# Patient Record
Sex: MALE | Race: WHITE | NOT HISPANIC OR LATINO | Employment: FULL TIME | URBAN - METROPOLITAN AREA
[De-identification: names, ages, dates, MRNs, and addresses within clinical notes are randomized per-mention and may not be internally consistent; named-entity substitution may affect disease eponyms.]

---

## 2021-02-22 ENCOUNTER — APPOINTMENT (OUTPATIENT)
Dept: GENERAL RADIOLOGY | Facility: CLINIC | Age: 26
End: 2021-02-22
Attending: NURSE PRACTITIONER

## 2021-02-22 ENCOUNTER — HOSPITAL ENCOUNTER (EMERGENCY)
Facility: CLINIC | Age: 26
Discharge: HOME | End: 2021-02-22
Attending: NURSE PRACTITIONER | Admitting: NURSE PRACTITIONER

## 2021-02-22 VITALS
SYSTOLIC BLOOD PRESSURE: 124 MMHG | HEART RATE: 79 BPM | DIASTOLIC BLOOD PRESSURE: 77 MMHG | WEIGHT: 145 LBS | RESPIRATION RATE: 16 BRPM | BODY MASS INDEX: 24.5 KG/M2 | OXYGEN SATURATION: 98 % | TEMPERATURE: 98.5 F

## 2021-02-22 DIAGNOSIS — S83.91XA SPRAIN OF RIGHT KNEE, UNSPECIFIED LIGAMENT, INITIAL ENCOUNTER: ICD-10-CM

## 2021-02-22 PROCEDURE — 73552 X-RAY EXAM OF FEMUR 2/>: CPT | Mod: RT

## 2021-02-22 PROCEDURE — 99285 EMERGENCY DEPT VISIT HI MDM: CPT | Performed by: NURSE PRACTITIONER

## 2021-02-22 PROCEDURE — 250N000011 HC RX IP 250 OP 636: Performed by: NURSE PRACTITIONER

## 2021-02-22 PROCEDURE — 73562 X-RAY EXAM OF KNEE 3: CPT | Mod: RT

## 2021-02-22 PROCEDURE — 96372 THER/PROPH/DIAG INJ SC/IM: CPT | Performed by: NURSE PRACTITIONER

## 2021-02-22 PROCEDURE — 250N000013 HC RX MED GY IP 250 OP 250 PS 637: Performed by: NURSE PRACTITIONER

## 2021-02-22 RX ORDER — IBUPROFEN 100 MG/5ML
600 SUSPENSION, ORAL (FINAL DOSE FORM) ORAL ONCE
Status: COMPLETED | OUTPATIENT
Start: 2021-02-22 | End: 2021-02-22

## 2021-02-22 RX ADMIN — IBUPROFEN 600 MG: 100 SUSPENSION ORAL at 18:47

## 2021-02-22 RX ADMIN — HYDROMORPHONE HYDROCHLORIDE 1 MG: 1 INJECTION, SOLUTION INTRAMUSCULAR; INTRAVENOUS; SUBCUTANEOUS at 18:39
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
## 2021-02-23 NOTE — ED PROVIDER NOTES
History     Chief Complaint   Patient presents with     Leg Pain     HPI  Charbel Heard is a 25 year old male who presents to the emergency department for evaluation of right knee and thigh pain after falling on the ice approximately 1 hour prior to arrival.  Patient reports feeling his knee pop out of joint and go back and.  Patient states he has been unable to bear weight on his right leg since this occurred.  He did not hit his head.  Allergies:  Allergies   Allergen Reactions     Augmentin Anaphylaxis     Suprax [Cefixime] Anaphylaxis       Problem List:    Patient Active Problem List    Diagnosis Date Noted     Tobacco use disorder 12/23/2015     Priority: Medium        Past Medical History:    History reviewed. No pertinent past medical history.    Past Surgical History:    Past Surgical History:   Procedure Laterality Date     APPENDECTOMY       HERNIA REPAIR         Family History:    No family history on file.    Social History:  Marital Status:  Single [1]  Social History     Tobacco Use     Smoking status: Current Every Day Smoker     Packs/day: 1.00     Smokeless tobacco: Never Used   Substance Use Topics     Alcohol use: Yes     Comment: Once/year     Drug use: No        Medications:    No current outpatient medications on file.        Review of Systems  As mentioned above in the history present illness. All other systems were reviewed and are negative.    Physical Exam   BP: 124/77  Pulse: 79  Temp: 98.5  F (36.9  C)  Resp: 16  Weight: 65.8 kg (145 lb)  SpO2: 98 %      Physical Exam  Constitutional:       General: He is in acute distress.      Appearance: Normal appearance. He is not ill-appearing.   Cardiovascular:      Rate and Rhythm: Normal rate.   Pulmonary:      Effort: Pulmonary effort is normal.   Musculoskeletal:      Right knee: He exhibits decreased range of motion. He exhibits no swelling and no deformity. Tenderness found. MCL, LCL and patellar tendon tenderness noted.      Right  upper leg: He exhibits tenderness. He exhibits no swelling and no deformity.   Skin:     General: Skin is warm and dry.   Neurological:      General: No focal deficit present.      Mental Status: He is alert and oriented to person, place, and time.      Comments: Right foot pedal pulse is normal. Right foot/leg warm and pink.         ED Course        Procedures         Results for orders placed or performed during the hospital encounter of 02/22/21 (from the past 24 hour(s))   XR Knee Right 3 Views    Narrative    EXAM: XR FEMUR RT 2 VW, XR KNEE RT 3 VW  LOCATION: [facility]  DATE/TIME: 2/22/2021 7:15 PM    INDICATION: Right leg pain.  COMPARISON: None.      Impression    IMPRESSION: Negative right femur. No fracture. No degenerative changes. Negative right knee. No fracture or joint effusion. No degenerative changes.   XR Femur Right 2 Views    Narrative    EXAM: XR FEMUR RT 2 VW, XR KNEE RT 3 VW  LOCATION: [facility]  DATE/TIME: 2/22/2021 7:15 PM    INDICATION: Right leg pain.  COMPARISON: None.      Impression    IMPRESSION: Negative right femur. No fracture. No degenerative changes. Negative right knee. No fracture or joint effusion. No degenerative changes.       Medications   ibuprofen (ADVIL/MOTRIN) suspension 600 mg (600 mg Oral Given 2/22/21 1847)   HYDROmorphone (DILAUDID) injection 1 mg (1 mg Intramuscular Given 2/22/21 1839)       Assessments & Plan (with Medical Decision Making)    25 year old male who presents to the emergency department for evaluation of right knee and thigh pain after falling on the ice approximately 1 hour prior to arrival.  Patient reports feeling his knee pop out of joint and go back and.  Patient states he has been unable to bear weight on his right leg since this occurred.  He did not hit his head.   on exam patient is alert and oriented. Appears distressed and in pain. Right lateral thigh tenderness. Right knee tenderness both medially and laterally.   Tenderness over the patellar tendon.  Right leg is neurovascularly intact.  No swelling or deformity noted.  X-ray of the right femur and right knee is negative.  Patient was given IM Dilaudid and p.o. ibuprofen with some improvement in his pain.  I discussed the imaging findings with patient and his mother.  I recommend close follow-up with orthopedics for likely right knee sprain.  Unfortunately, patient has no insurance and is unclear if he will be able to see orthopedics due to lack of financial resources.  I did explain that he does not need follow-up d/t concern that he is not weight bearing at this time due to his acuity of pain. He could see his primary care provider in the next week to ensure he is improving.  Patient was fitted with an Ace wrap and crutches here today.      Instructed as follows:  Non-weight bearing with crutches, you may begin weight bearing in 2-3 days as tolerated.  Wear ace wrap during the day.  Elevate and rest your knee as much as possible for the next 2 days.    Ice 10-15 minutes at a time 3-4 times a day. (minimum of 60 minutes off).  Tylenol 650 mg every 4-6 hours as needed for pain. or  Ibuprofen 400-600 mg every 6-8 hours as needed for pain  (take with food, stop if causing stomach pains.)  Follow-up with orthopedics. Referral has been sent. They should call you or you can contact them to set-up appointment [phone].  As discussed, you can also recheck in clinic with your regular provider if not able to see orthopedics and improving.  I have reviewed the nursing notes.    I have reviewed the findings, diagnosis, plan and need for follow up with the patient.    New Prescriptions    No medications on file       Final diagnoses:   Sprain of right knee, unspecified ligament, initial encounter       2/22/2021   [facility] EMERGENCY DEPT     Penelope, ISAURA Valverde CNP  02/22/21 1958

## 2021-02-23 NOTE — DISCHARGE INSTRUCTIONS
Non-weight bearing with crutches, you may begin weight bearing in 2-3 days as tolerated.  Wear ace wrap during the day.  Elevate and rest your knee as much as possible for the next 2 days.    Ice 10-15 minutes at a time 3-4 times a day. (minimum of 60 minutes off).  Tylenol 650 mg every 4-6 hours as needed for pain. or  Ibuprofen 400-600 mg every 6-8 hours as needed for pain  (take with food, stop if causing stomach pains.)  Follow-up with orthopedics. Referral has been sent. They should call you or you can contact them to set-up appointment [phone].  As discussed, you can also recheck in clinic with your regular provider if not able to see orthopedics and improving.

## 2021-12-21 ENCOUNTER — TELEPHONE (OUTPATIENT)
Dept: ORTHOPEDICS | Facility: CLINIC | Age: 26
End: 2021-12-21
Payer: COMMERCIAL
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
## 2021-12-22 ENCOUNTER — OFFICE VISIT (OUTPATIENT)
Dept: ORTHOPEDICS | Facility: CLINIC | Age: 26
End: 2021-12-22
Payer: COMMERCIAL

## 2021-12-22 ENCOUNTER — ANCILLARY PROCEDURE (OUTPATIENT)
Dept: GENERAL RADIOLOGY | Facility: CLINIC | Age: 26
End: 2021-12-22
Attending: PREVENTIVE MEDICINE
Payer: COMMERCIAL

## 2021-12-22 DIAGNOSIS — M54.16 LUMBAR RADICULOPATHY: ICD-10-CM
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
## 2021-12-22 DIAGNOSIS — M51.369 LUMBAR DEGENERATIVE DISC DISEASE: ICD-10-CM

## 2021-12-22 DIAGNOSIS — M54.16 LUMBAR RADICULOPATHY: Primary | ICD-10-CM

## 2021-12-22 DIAGNOSIS — M62.830 LUMBAR PARASPINAL MUSCLE SPASM: ICD-10-CM

## 2021-12-22 DIAGNOSIS — S32.020A COMPRESSION FRACTURE OF L2 VERTEBRA, INITIAL ENCOUNTER (H): ICD-10-CM

## 2021-12-22 LAB — RADIOLOGIST FLAGS: ABNORMAL

## 2021-12-22 PROCEDURE — 72100 X-RAY EXAM L-S SPINE 2/3 VWS: CPT | Performed by: RADIOLOGY

## 2021-12-22 PROCEDURE — 99204 OFFICE O/P NEW MOD 45 MIN: CPT | Performed by: PREVENTIVE MEDICINE

## 2021-12-22 RX ORDER — HYDROCODONE BITARTRATE AND ACETAMINOPHEN 5; 325 MG/1; MG/1
1 TABLET ORAL EVERY 6 HOURS PRN
Qty: 28 TABLET | Refills: 0 | Status: SHIPPED | OUTPATIENT
Start: 2021-12-22 | End: 2022-02-07

## 2021-12-22 RX ORDER — GABAPENTIN 100 MG/1
200 CAPSULE ORAL 3 TIMES DAILY
Qty: 180 CAPSULE | Refills: 1 | Status: SHIPPED | OUTPATIENT
Start: 2021-12-22 | End: 2022-02-07
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
## 2021-12-22 NOTE — PATIENT INSTRUCTIONS
Thanks for coming today.  [facility] Clinic  [address] [place] [address]    To schedule future appointments in Ortho Clinic, you may call [phone].    To schedule ordered imaging by your provider:   Call [facility] [phone]    To schedule an injection ordered by your provider:  Call [facility] Injection scheduling line: [phone]  [facility] available online at:  [email or web address]    Please call if any further questions or concerns ([phone]).  Clinic hours 8 am to 5 pm.    Return to clinic (call) if symptoms worsen or fail to improve.

## 2021-12-22 NOTE — LETTER
December 22, 2021      Charbel Heard  [address] [address] [place] [id number]              To whom it may concern:  Charbel is under my care for a medical problem. He will be unable to work and fulfill his job duties until at least 1/9/21. I will followup with him on 1/5/21 and determine further work restrictions.  Please contact me with any questions or concerns.          Sincerely,      Rebel Meredith MD
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
## 2021-12-23 ENCOUNTER — TELEPHONE (OUTPATIENT)
Dept: ORTHOPEDICS | Facility: CLINIC | Age: 26
End: 2021-12-23
Payer: COMMERCIAL

## 2021-12-23 NOTE — TELEPHONE ENCOUNTER
12/23 Provided phone number [phone] to schedule mri.     [facility]   Orthopedics, Podiatry, Sports Medicine, ENT/Eye Specialties  [facility]   [phone]

## 2021-12-27 ENCOUNTER — OFFICE VISIT (OUTPATIENT)
Dept: FAMILY MEDICINE | Facility: OTHER | Age: 26
End: 2021-12-27
Payer: COMMERCIAL

## 2021-12-27 VITALS
OXYGEN SATURATION: 98 % | WEIGHT: 162 LBS | TEMPERATURE: 97.9 F | DIASTOLIC BLOOD PRESSURE: 70 MMHG | SYSTOLIC BLOOD PRESSURE: 110 MMHG | BODY MASS INDEX: 27.38 KG/M2 | RESPIRATION RATE: 18 BRPM | HEART RATE: 71 BPM

## 2021-12-27 DIAGNOSIS — D3A.00 CARCINOID TUMOR, UNSPECIFIED SITE, UNSPECIFIED WHETHER MALIGNANT (H): Primary | ICD-10-CM

## 2021-12-27 PROCEDURE — 99203 OFFICE O/P NEW LOW 30 MIN: CPT | Performed by: STUDENT IN AN ORGANIZED HEALTH CARE EDUCATION/TRAINING PROGRAM
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
## 2021-12-28 ENCOUNTER — PRE VISIT (OUTPATIENT)
Dept: OTHER | Age: 26
End: 2021-12-28
Payer: COMMERCIAL

## 2021-12-28 ENCOUNTER — TELEPHONE (OUTPATIENT)
Dept: GASTROENTEROLOGY | Facility: CLINIC | Age: 26
End: 2021-12-28
Payer: COMMERCIAL

## 2021-12-28 NOTE — TELEPHONE ENCOUNTER
Action 12.28.21 12:46 PM ZOE   Action Taken CT spine/lumbar on 12/19     Called [facility]  Just in case but they said they could not push the image. Faxed request for imaging disc [phone]      Please let Soco Belcher, RN know when disc is received.

## 2021-12-28 NOTE — CONFIDENTIAL NOTE
[facility]     Referring provider: Marbin Larsen MD    Referred to: [facility]     Provider Requested: none specified     Referral Received: 12/28/21     Records received: [facility]  CT 12/19/21  IMPRESSIONS:   1. A moderate acute compression fracture the superior endplate of L2 is present with loss of 50 percent of the vertebral body height. No significant retropulsion or osseous central canal stenosis is seen.   2. There is an incidental soft tissue nodule within the midline pelvis on image 253, series 6 and measures 1 x 0.7 cm. The differential diagnosis includes mesenteric adenopathy or carcinoid tumor.        Images received: PACs    Evaluation for: Carcinoid tumor, unspecified site, unspecified whether malignant     Clinical History (per RN review):   Office visit 12/27  Incidental finding carcinoid tumor versus mesenteric adenopathy  Carcinoid tumor, unspecified site, unspecified whether malignant, possible  Incidental finding on CT imaging of mesenteric adenopathy versus carcinoid.  Patient has no symptoms suggesting carcinoid at this time.  He also has no signs or symptoms of any infectious etiology as well as any bowel concerns or bowel abnormalities, no history of STDs or current symptoms.  Did describe to the patient about the realm of possibilities for work-up for carcinoid and he feels most comfortable starting with urine metanephrines at this time.  Did discuss with him about EGD/colonoscopy and he wishes to hold off until discussing with GI specialist which I think is fair.  He is also dealing with a compression fracture of his low back and would prefer to have this heal a bit more before he considers further intervention.  We will have him follow-up with GI for next best steps for this incidental findings.  - 5 HIAA Quantitative Urine  - Adult Gastro Ref - Consult Only; Future    MD review date:   MD Decision for clinic consultation/Orders:             Referral updates/Patient contacted:

## 2021-12-29 ENCOUNTER — TELEPHONE (OUTPATIENT)
Dept: ORTHOPEDICS | Facility: CLINIC | Age: 26
End: 2021-12-29
Payer: COMMERCIAL

## 2021-12-29 NOTE — TELEPHONE ENCOUNTER
[facility] Call Center    Phone Message    May a detailed message be left on voicemail: yes     Reason for Call: Pt is requesting a call back to let him know when his disability papers are going to be faxed. He thought it would have been done by now.  Thanks.    Action Taken: Message routed to:  Adult Clinics: Sports Medicine p [phone]    Travel Screening: Not Applicable

## 2021-12-30 NOTE — TELEPHONE ENCOUNTER
Dr. Meredith faxed papers yesterday. Patient replied on [employer] asking for the papers to be emailed to him.    Please see [employer] encounter for future communication.      Xiomara Osorio MA, ATC

## 2021-12-31 NOTE — TELEPHONE ENCOUNTER
Action December 31, 2021 11:18 AM ABT   Action Taken Imaging disc from [facility] received and given to Jostin for upload.

## 2022-01-03 ENCOUNTER — PATIENT OUTREACH (OUTPATIENT)
Dept: GASTROENTEROLOGY | Facility: CLINIC | Age: 27
End: 2022-01-03
Payer: COMMERCIAL

## 2022-01-03 DIAGNOSIS — R93.89 ABNORMAL FINDING ON IMAGING: Primary | ICD-10-CM

## 2022-01-03 RX ORDER — HYDROCODONE BITARTRATE AND ACETAMINOPHEN 5; 325 MG/1; MG/1
1 TABLET ORAL 3 TIMES DAILY PRN
Qty: 12 TABLET | Refills: 0 | Status: SHIPPED | OUTPATIENT
Start: 2022-01-03 | End: 2022-01-07

## 2022-01-03 NOTE — PROGRESS NOTES
Called pt to discuss, pt in agreement with recommendations per Dr Ochoa    CT CAP with contrast. Ind - unexplained adenopathy on CT of lumbar spine.   Will need labs  (creatinine) checked prior the CT as has never had, so should get additional labs at that time.   CMP, CBC.   Virtual consult after CT if obtained, read and available for review    Virtual visit arranged for 1/24 at 9:20am. Clinic coordinators message to assist in scheduling labs and CT scan prior to    Soco Belcher RN, BSN,   [facility]  Care coordinator

## 2022-01-04 ENCOUNTER — ANCILLARY PROCEDURE (OUTPATIENT)
Dept: MRI IMAGING | Facility: CLINIC | Age: 27
End: 2022-01-04
Attending: PREVENTIVE MEDICINE
Payer: COMMERCIAL

## 2022-01-04 DIAGNOSIS — S32.020A COMPRESSION FRACTURE OF L2 VERTEBRA, INITIAL ENCOUNTER (H): ICD-10-CM

## 2022-01-04 PROCEDURE — 72148 MRI LUMBAR SPINE W/O DYE: CPT | Performed by: RADIOLOGY

## 2022-01-05 ENCOUNTER — OFFICE VISIT (OUTPATIENT)
Dept: ORTHOPEDICS | Facility: CLINIC | Age: 27
End: 2022-01-05
Payer: COMMERCIAL

## 2022-01-05 DIAGNOSIS — S32.020S CLOSED COMPRESSION FRACTURE OF L2 LUMBAR VERTEBRA, SEQUELA: Primary | ICD-10-CM

## 2022-01-05 PROCEDURE — 99213 OFFICE O/P EST LOW 20 MIN: CPT | Performed by: PREVENTIVE MEDICINE

## 2022-01-05 NOTE — PROGRESS NOTES
[facility] FOLLOW-UP VISIT 1/5/2022    Charbel Heard's chief complaint for this visit includes:  Chief Complaint   Patient presents with     RECHECK     Follow up on MRI     PCP: Vince Wall    Referring Provider:  No referring provider defined for this encounter.    There were no vitals taken for this visit.  Data Unavailable       Interval History:     Follow up reason: Follow up MRI 1/4/2022    Date of injury: 12/19/2021    Date last seen: 12/22/2021    Following Therapeutic Plan: Yes     Pain: Improving    Function: Improving    Interval History: Patient is improving.      Medical History:    Any recent changes to your medical history? No    Any new medication prescribed since last visit? No    Review of Systems:    Do you have fever, chills, weight loss? No    Do you have any vision problems? No    Do you have any chest pain or edema? No    Do you have any shortness of breath or wheezing?  No    Do you have stomach problems? No    Do you have any urinary track issues? No    Do you have any numbness or focal weakness? No    Do you have diabetes? No    Do you have problems with bleeding or clotting? No    Do you have an rashes or other skin lesions? No

## 2022-01-05 NOTE — LETTER
1/5/2022         RE: Charbel Heard  [address] [address] [place] [address]        Dear Colleague,    Thank you for referring your patient, Charbel Heard, to the [facility] [place]. Please see a copy of my visit note below.          [facility] FOLLOW-UP VISIT 1/5/2022    Charbel Heard's chief complaint for this visit includes:  Chief Complaint   Patient presents with     RECHECK     Follow up on MRI     PCP: Vince Wall    Referring Provider:  No referring provider defined for this encounter.    There were no vitals taken for this visit.  Data Unavailable       Interval History:     Follow up reason: Follow up MRI 1/4/2022    Date of injury: 12/19/2021    Date last seen: 12/22/2021    Following Therapeutic Plan: Yes     Pain: Improving    Function: Improving    Interval History: Patient is improving.      Medical History:    Any recent changes to your medical history? No    Any new medication prescribed since last visit? No    Review of Systems:    Do you have fever, chills, weight loss? No    Do you have any vision problems? No    Do you have any chest pain or edema? No    Do you have any shortness of breath or wheezing?  No    Do you have stomach problems? No    Do you have any urinary track issues? No    Do you have any numbness or focal weakness? No    Do you have diabetes? No    Do you have problems with bleeding or clotting? No    Do you have an rashes or other skin lesions? No    HISTORY OF PRESENT ILLNESS  Mr. Heard is a pleasant 26 year old year old male who presents to clinic today following up for Lumbar compression fracture  Pain is improved  Still having some pain with movements, but better controlled with medications and rest  No new symptoms      MEDICAL HISTORY  Patient Active Problem List   Diagnosis     Tobacco use disorder       Current Outpatient Medications   Medication Sig Dispense Refill     gabapentin  (NEURONTIN) 100 MG capsule Take 2 capsules (200 mg) by mouth 3 times daily 180 capsule 1     HYDROcodone-acetaminophen (NORCO) 5-325 MG tablet Take 1 tablet by mouth every 6 hours as needed for severe pain 28 tablet 0     tiZANidine (ZANAFLEX) 4 MG tablet Take 1 tablet (4 mg) by mouth 3 times daily as needed for muscle spasms 60 tablet 1       Allergies   Allergen Reactions     Augmentin Anaphylaxis     Suprax [Cefixime] Anaphylaxis       No family history on file.  Social History     Socioeconomic History     Marital status: Single     Spouse name: Not on file     Number of children: Not on file     Years of education: Not on file     Highest education level: Not on file   Occupational History     Not on file   Tobacco Use     Smoking status: Current Every Day Smoker     Packs/day: 1.00     Smokeless tobacco: Never Used   Vaping Use     Vaping Use: Never used   Substance and Sexual Activity     Alcohol use: Yes     Comment: Once/year     Drug use: No     Sexual activity: Yes     Partners: Female     Birth control/protection: Condom   Other Topics Concern     Not on file   Social History Narrative     Not on file     Social Determinants of Health     Financial Resource Strain: Not on file   Food Insecurity: Not on file   Transportation Needs: Not on file   Physical Activity: Not on file   Stress: Not on file   Social Connections: Not on file   Intimate Partner Violence: Not on file   Housing Stability: Not on file       Additional medical/Social/Surgical histories reviewed in [facility] and updated as appropriate.     REVIEW OF SYSTEMS (1/5/2022)  10 point ROS of systems including Constitutional, Eyes, Respiratory, Cardiovascular, Gastroenterology, Genitourinary, Integumentary, Musculoskeletal, Psychiatric, Allergic/Immunologic were all negative except for pertinent positives noted in my HPI.     PHYSICAL EXAM  VSS  General  - normal appearance, in no obvious distress  HEENT  - conjunctivae not injected, moist mucous  membranes, normocephalic/atraumatic head, ears normal appearance, no lesions, mouth normal appearance, no scars, normal dentition and teeth present  CV  - normal peripheral perfusion  Pulm  - normal respiratory pattern, non-labored  Musculoskeletal - lumbar spine  - stance: normal gait without limp, no obvious leg length discrepancy, normal heel and toe walk  - inspection: normal bone and joint alignment, no obvious scoliosis  - palpation: ttp over L2 vertebrae in area of compression fracture  And along paraspinal muscles in that area  - ROM: flexion exacerbates pain, normal extension, sidebending, rotation  - strength: lower extremities 5/5 in all planes    Neuro  - patellar and Achilles DTRs 2+ bilaterally, NO lower extremity sensory deficit throughout L5 distribution, grossly normal coordination, normal muscle tone  Skin  - no ecchymosis, erythema, warmth, or induration, no obvious rash  Psych  - interactive, appropriate, normal mood and affect  ASSESSMENT & PLAN  27 yo male with L2 compression fracture, improved, not resolved    I independently reviewed the following imaging studies:    Stable appearance of the anterior wedging compression deformity of  L2 with edema along the fracture line and the appearance of a benign  Fracture.    Will plan on followup in 2 - 3weeks and repeat xrays  Cont. Medications  Cont. Light stretches  Given work note and filled out paperwork for work restrictions    Appropriate PPE was utilized for prevention of spread of Covid-19.  Rebel Meredith MD, CAM        Again, thank you for allowing me to participate in the care of your patient.        Sincerely,        Rebel Meredith MD

## 2022-01-05 NOTE — LETTER
January 5, 2022      RE: Charbel Heard  [address] [address] [place] [address]        To whom it may concern:    Charbel Heard is under my professional care for a medical problem. He will be unable to work and fulfill his job duties until at least 1/19/22. I will followup with him on 1/19/22 and determine further work restrictions.  Please contact me with any questions or concerns.      Sincerely,      eRbel Meredith MD

## 2022-01-07 ENCOUNTER — TELEPHONE (OUTPATIENT)
Dept: GASTROENTEROLOGY | Facility: CLINIC | Age: 27
End: 2022-01-07
Payer: COMMERCIAL

## 2022-01-13 ENCOUNTER — APPOINTMENT (OUTPATIENT)
Dept: LAB | Facility: OTHER | Age: 27
End: 2022-01-13
Payer: COMMERCIAL

## 2022-01-13 ENCOUNTER — PATIENT OUTREACH (OUTPATIENT)
Dept: GASTROENTEROLOGY | Facility: CLINIC | Age: 27
End: 2022-01-13

## 2022-01-13 ENCOUNTER — LAB (OUTPATIENT)
Dept: LAB | Facility: CLINIC | Age: 27
End: 2022-01-13
Payer: COMMERCIAL

## 2022-01-13 ENCOUNTER — ANCILLARY PROCEDURE (OUTPATIENT)
Dept: CT IMAGING | Facility: CLINIC | Age: 27
End: 2022-01-13
Attending: INTERNAL MEDICINE
Payer: COMMERCIAL

## 2022-01-13 DIAGNOSIS — R93.89 ABNORMAL FINDING ON IMAGING: ICD-10-CM

## 2022-01-13 LAB
ALBUMIN SERPL-MCNC: 3.9 G/DL (ref 3.4–5)
ALP SERPL-CCNC: 108 U/L (ref 40–150)
ALT SERPL W P-5'-P-CCNC: 62 U/L (ref 0–70)
ANION GAP SERPL CALCULATED.3IONS-SCNC: 4 MMOL/L (ref 3–14)
AST SERPL W P-5'-P-CCNC: 14 U/L (ref 0–45)
BILIRUB SERPL-MCNC: 0.2 MG/DL (ref 0.2–1.3)
BUN SERPL-MCNC: 6 MG/DL (ref 7–30)
CALCIUM SERPL-MCNC: 9 MG/DL (ref 8.5–10.1)
CHLORIDE BLD-SCNC: 106 MMOL/L (ref 94–109)
CO2 SERPL-SCNC: 28 MMOL/L (ref 20–32)
CREAT SERPL-MCNC: 0.75 MG/DL (ref 0.66–1.25)
ERYTHROCYTE [DISTWIDTH] IN BLOOD BY AUTOMATED COUNT: 11.9 % (ref 10–15)
GFR SERPL CREATININE-BSD FRML MDRD: >90 ML/MIN/1.73M2
GLUCOSE BLD-MCNC: 127 MG/DL (ref 70–99)
HCT VFR BLD AUTO: 46.9 % (ref 40–53)
HGB BLD-MCNC: 15.8 G/DL (ref 13.3–17.7)
MCH RBC QN AUTO: 30.8 PG (ref 26.5–33)
MCHC RBC AUTO-ENTMCNC: 33.7 G/DL (ref 31.5–36.5)
MCV RBC AUTO: 91 FL (ref 78–100)
PLATELET # BLD AUTO: 271 10E3/UL (ref 150–450)
POTASSIUM BLD-SCNC: 4 MMOL/L (ref 3.4–5.3)
PROT SERPL-MCNC: 8 G/DL (ref 6.8–8.8)
RADIOLOGIST FLAGS: ABNORMAL
RBC # BLD AUTO: 5.13 10E6/UL (ref 4.4–5.9)
SODIUM SERPL-SCNC: 138 MMOL/L (ref 133–144)
WBC # BLD AUTO: 6.2 10E3/UL (ref 4–11)

## 2022-01-13 PROCEDURE — 85027 COMPLETE CBC AUTOMATED: CPT

## 2022-01-13 PROCEDURE — 71260 CT THORAX DX C+: CPT | Performed by: RADIOLOGY

## 2022-01-13 PROCEDURE — 36415 COLL VENOUS BLD VENIPUNCTURE: CPT

## 2022-01-13 PROCEDURE — 74177 CT ABD & PELVIS W/CONTRAST: CPT | Performed by: RADIOLOGY

## 2022-01-13 PROCEDURE — 80053 COMPREHEN METABOLIC PANEL: CPT

## 2022-01-13 RX ORDER — IOPAMIDOL 755 MG/ML
99 INJECTION, SOLUTION INTRAVASCULAR ONCE
Status: COMPLETED | OUTPATIENT
Start: 2022-01-13 | End: 2022-01-13

## 2022-01-13 RX ADMIN — IOPAMIDOL 99 ML: 755 INJECTION, SOLUTION INTRAVASCULAR at 12:18

## 2022-01-13 NOTE — PROGRESS NOTES
"Called pt to update that findings on CT scan of  \"Peripheral groundglass opacities, most prominent in the posterior  lower lobes. Findings can be seen in viral infection such as COVID 19.\"    Reviewed with Dr Ochoa. Advised pt to seek care if develops respiratory symptoms or distress. Pt verbalized understanding, no symptoms at this time    Soco Belcher, RN, BSN,   [facility]  Care coordinator      "

## 2022-01-17 NOTE — TELEPHONE ENCOUNTER
RECORDS STATUS - ALL OTHER DIAGNOSIS      RECORDS RECEIVED FROM: [facility]   DATE RECEIVED: 22   NOTES STATUS DETAILS   OFFICE NOTE from referring provider EPIC 21: Dr. Marbin Larsen   OFFICE NOTE from medical oncologist     DISCHARGE SUMMARY from hospital     DISCHARGE REPORT from the ER [facility] 21: [facility]   OPERATIVE REPORT     MEDICATION LIST [facility]    CLINICAL TRIAL TREATMENTS TO DATE     LABS     PATHOLOGY REPORTS     ANYTHING RELATED TO DIAGNOSIS Epic Most recent labs 22   GENONOMIC TESTING     TYPE:     IMAGING (NEED IMAGES & REPORT)  [facility] Disc [employer]   CT SCANS PACS 22: CT Chest  21: CT Spine   XRAYS PACS 21: XR Lumbar   MRI PACS 22: MR Lumbar   MAMMO     ULTRASOUND     PET       Action 2022 11:10 AM ABT   Action Taken Imaging disc from [facility] received and given to Jostin for upload

## 2022-01-19 ENCOUNTER — OFFICE VISIT (OUTPATIENT)
Dept: ORTHOPEDICS | Facility: CLINIC | Age: 27
End: 2022-01-19
Payer: COMMERCIAL

## 2022-01-19 ENCOUNTER — APPOINTMENT (OUTPATIENT)
Dept: LAB | Facility: CLINIC | Age: 27
End: 2022-01-19
Payer: COMMERCIAL

## 2022-01-19 ENCOUNTER — ANCILLARY PROCEDURE (OUTPATIENT)
Dept: GENERAL RADIOLOGY | Facility: CLINIC | Age: 27
End: 2022-01-19
Attending: PREVENTIVE MEDICINE
Payer: COMMERCIAL

## 2022-01-19 DIAGNOSIS — S32.020S CLOSED COMPRESSION FRACTURE OF L2 LUMBAR VERTEBRA, SEQUELA: Primary | ICD-10-CM

## 2022-01-19 DIAGNOSIS — M62.830 LUMBAR PARASPINAL MUSCLE SPASM: ICD-10-CM

## 2022-01-19 DIAGNOSIS — S32.020S CLOSED COMPRESSION FRACTURE OF L2 LUMBAR VERTEBRA, SEQUELA: ICD-10-CM

## 2022-01-19 PROCEDURE — 99213 OFFICE O/P EST LOW 20 MIN: CPT | Performed by: PREVENTIVE MEDICINE

## 2022-01-19 PROCEDURE — 72100 X-RAY EXAM L-S SPINE 2/3 VWS: CPT | Performed by: RADIOLOGY

## 2022-01-19 PROCEDURE — 83497 ASSAY OF 5-HIAA: CPT | Mod: 90 | Performed by: STUDENT IN AN ORGANIZED HEALTH CARE EDUCATION/TRAINING PROGRAM

## 2022-01-19 PROCEDURE — 99000 SPECIMEN HANDLING OFFICE-LAB: CPT | Performed by: STUDENT IN AN ORGANIZED HEALTH CARE EDUCATION/TRAINING PROGRAM

## 2022-01-19 NOTE — LETTER
1/19/2022         RE: Charbel Heard  [address] [facility] [address]        Dear Colleague,    Thank you for referring your patient, Charbel Heard, to the [facility] [place]. Please see a copy of my visit note below.    HISTORY OF PRESENT ILLNESS  Mr. Heard is a pleasant 26 year old year old male who presents to clinic today with followup for compression fracture in lumbar spine  Doing better  Wants to review imaging  And discuss options  Has not started PT yet  Only using otc tylenol and ibuprofen  Has not worked yet and wants to discuss    MEDICAL HISTORY  Patient Active Problem List   Diagnosis     Tobacco use disorder       Current Outpatient Medications   Medication Sig Dispense Refill     gabapentin (NEURONTIN) 100 MG capsule Take 2 capsules (200 mg) by mouth 3 times daily 180 capsule 1     HYDROcodone-acetaminophen (NORCO) 5-325 MG tablet Take 1 tablet by mouth every 6 hours as needed for severe pain 28 tablet 0     tiZANidine (ZANAFLEX) 4 MG tablet Take 1 tablet (4 mg) by mouth 3 times daily as needed for muscle spasms 60 tablet 1       Allergies   Allergen Reactions     Augmentin Anaphylaxis     Suprax [Cefixime] Anaphylaxis       No family history on file.  Social History     Socioeconomic History     Marital status: Single     Spouse name: Not on file     Number of children: Not on file     Years of education: Not on file     Highest education level: Not on file   Occupational History     Not on file   Tobacco Use     Smoking status: Current Every Day Smoker     Packs/day: 1.00     Smokeless tobacco: Never Used   Vaping Use     Vaping Use: Never used   Substance and Sexual Activity     Alcohol use: Yes     Comment: Once/year     Drug use: No     Sexual activity: Yes     Partners: Female     Birth control/protection: Condom   Other Topics Concern     Not on file   Social History Narrative     Not on file     Social Determinants of Health      Financial Resource Strain: Not on file   Food Insecurity: Not on file   Transportation Needs: Not on file   Physical Activity: Not on file   Stress: Not on file   Social Connections: Not on file   Intimate Partner Violence: Not on file   Housing Stability: Not on file       Additional medical/Social/Surgical histories reviewed in [facility] and updated as appropriate.     REVIEW OF SYSTEMS (1/19/2022)  10 point ROS of systems including Constitutional, Eyes, Respiratory, Cardiovascular, Gastroenterology, Genitourinary, Integumentary, Musculoskeletal, Psychiatric, Allergic/Immunologic were all negative except for pertinent positives noted in my HPI.     PHYSICAL EXAM  VSS  General  - normal appearance, in no obvious distress  HEENT  - conjunctivae not injected, moist mucous membranes, normocephalic/atraumatic head, ears normal appearance, no lesions, mouth normal appearance, no scars, normal dentition and teeth present  CV  - normal peripheral perfusion  Pulm  - normal respiratory pattern, non-labored  Musculoskeletal - lumbar spine  - stance: normal gait without limp, no obvious leg length discrepancy, normal heel and toe walk  - inspection: normal bone and joint alignment, no obvious scoliosis  - palpation: no paravertebral or bony tenderness  - ROM: flexion exacerbates pain, normal extension, sidebending, rotation  - strength: lower extremities 5/5 in all planes  - special tests:  (-) straight leg raise  (+) slump test  Neuro  - patellar and Achilles DTRs 2+ bilaterally, NO lower extremity sensory deficit throughout L5 distribution, grossly normal coordination, normal muscle tone  Skin  - no ecchymosis, erythema, warmth, or induration, no obvious rash  Psych  - interactive, appropriate, normal mood and affect  ASSESSMENT & PLAN  25 yo male with lumbar compression fracture, stable, slightly improved    I independently reviewed the following imaging studies:  Lumbar xray and MRI: shows stable compression  fracture  Discussed ongoing work restrictions  Will f/u in 2 weeks to discuss  Cont. Medications PRN  Ordered PT    Appropriate PPE was utilized for prevention of spread of Covid-19.  Rebel Meredith MD, CAQSM        Again, thank you for allowing me to participate in the care of your patient.        Sincerely,        Rebel Meredith MD

## 2022-01-19 NOTE — PROGRESS NOTES
HISTORY OF PRESENT ILLNESS  Mr. Heard is a pleasant 26 year old year old male who presents to clinic today with followup for compression fracture in lumbar spine  Doing better  Wants to review imaging  And discuss options  Has not started PT yet  Only using otc tylenol and ibuprofen  Has not worked yet and wants to discuss    MEDICAL HISTORY  Patient Active Problem List   Diagnosis     Tobacco use disorder       Current Outpatient Medications   Medication Sig Dispense Refill     gabapentin (NEURONTIN) 100 MG capsule Take 2 capsules (200 mg) by mouth 3 times daily 180 capsule 1     HYDROcodone-acetaminophen (NORCO) 5-325 MG tablet Take 1 tablet by mouth every 6 hours as needed for severe pain 28 tablet 0     tiZANidine (ZANAFLEX) 4 MG tablet Take 1 tablet (4 mg) by mouth 3 times daily as needed for muscle spasms 60 tablet 1       Allergies   Allergen Reactions     Augmentin Anaphylaxis     Suprax [Cefixime] Anaphylaxis       No family history on file.  Social History     Socioeconomic History     Marital status: Single     Spouse name: Not on file     Number of children: Not on file     Years of education: Not on file     Highest education level: Not on file   Occupational History     Not on file   Tobacco Use     Smoking status: Current Every Day Smoker     Packs/day: 1.00     Smokeless tobacco: Never Used   Vaping Use     Vaping Use: Never used   Substance and Sexual Activity     Alcohol use: Yes     Comment: Once/year     Drug use: No     Sexual activity: Yes     Partners: Female     Birth control/protection: Condom   Other Topics Concern     Not on file   Social History Narrative     Not on file     Social Determinants of Health     Financial Resource Strain: Not on file   Food Insecurity: Not on file   Transportation Needs: Not on file   Physical Activity: Not on file   Stress: Not on file   Social Connections: Not on file   Intimate Partner Violence: Not on file   Housing Stability: Not on file        Additional medical/Social/Surgical histories reviewed in [facility] and updated as appropriate.     REVIEW OF SYSTEMS (1/19/2022)  10 point ROS of systems including Constitutional, Eyes, Respiratory, Cardiovascular, Gastroenterology, Genitourinary, Integumentary, Musculoskeletal, Psychiatric, Allergic/Immunologic were all negative except for pertinent positives noted in my HPI.     PHYSICAL EXAM  VSS  General  - normal appearance, in no obvious distress  HEENT  - conjunctivae not injected, moist mucous membranes, normocephalic/atraumatic head, ears normal appearance, no lesions, mouth normal appearance, no scars, normal dentition and teeth present  CV  - normal peripheral perfusion  Pulm  - normal respiratory pattern, non-labored  Musculoskeletal - lumbar spine  - stance: normal gait without limp, no obvious leg length discrepancy, normal heel and toe walk  - inspection: normal bone and joint alignment, no obvious scoliosis  - palpation: no paravertebral or bony tenderness  - ROM: flexion exacerbates pain, normal extension, sidebending, rotation  - strength: lower extremities 5/5 in all planes  - special tests:  (-) straight leg raise  (+) slump test  Neuro  - patellar and Achilles DTRs 2+ bilaterally, NO lower extremity sensory deficit throughout L5 distribution, grossly normal coordination, normal muscle tone  Skin  - no ecchymosis, erythema, warmth, or induration, no obvious rash  Psych  - interactive, appropriate, normal mood and affect  ASSESSMENT & PLAN  25 yo male with lumbar compression fracture, stable, slightly improved    I independently reviewed the following imaging studies:  Lumbar xray and MRI: shows stable compression fracture  Discussed ongoing work restrictions  Will f/u in 2 weeks to discuss  Cont. Medications PRN  Ordered PT    Appropriate PPE was utilized for prevention of spread of Covid-19.  Reble Meredith MD, CAQSM

## 2022-01-19 NOTE — LETTER
January 19, 2022      RE: Charbel Heard  [address] [facility] [address]        To whom it may concern:    Charbel Heard is under my professional care for a medical problem.  The patient can return to work as of 1/24/22 for up to 6 hours per day until I re-evaluate him on 2/2/22.    When the patient returns to work, the following restrictions apply until 2/2/22.  A) Bend and squat: Occasionally (6 hours), with use of lumbar support brace.    B) Walk/Stand: Occasionally up to 6 hours    E) Lift, carry, push, and pull no more than:  0-10 lbs.Light duty-unable to lift more than 10 pounds       Sincerely,      Rebel Meredith MD
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
## 2022-01-19 NOTE — LETTER
January 19, 2022      Charbel Heard  [address] [address] [place] [id number]              Charbel Heard is under my professional care for a medical problem. He will be unable to work and fulfill his job duties until at least 2/2/22. I will followup with him on 2/2/22 and determine further work restrictions.  Please contact me with any questions or concerns.      Sincerely,      Rebel Meredith MD

## 2022-01-23 ENCOUNTER — HEALTH MAINTENANCE LETTER (OUTPATIENT)
Age: 27
End: 2022-01-23

## 2022-01-24 ENCOUNTER — PRE VISIT (OUTPATIENT)
Dept: GASTROENTEROLOGY | Facility: CLINIC | Age: 27
End: 2022-01-24
Payer: COMMERCIAL

## 2022-01-24 ENCOUNTER — TELEPHONE (OUTPATIENT)
Dept: GASTROENTEROLOGY | Facility: CLINIC | Age: 27
End: 2022-01-24

## 2022-01-24 ENCOUNTER — PATIENT OUTREACH (OUTPATIENT)
Dept: GASTROENTEROLOGY | Facility: CLINIC | Age: 27
End: 2022-01-24

## 2022-01-24 ENCOUNTER — VIRTUAL VISIT (OUTPATIENT)
Dept: GASTROENTEROLOGY | Facility: CLINIC | Age: 27
End: 2022-01-24
Attending: INTERNAL MEDICINE
Payer: COMMERCIAL

## 2022-01-24 DIAGNOSIS — R93.89 ABNORMAL FINDING ON IMAGING: Primary | ICD-10-CM

## 2022-01-24 DIAGNOSIS — D3A.00 CARCINOID TUMOR, UNSPECIFIED SITE, UNSPECIFIED WHETHER MALIGNANT (H): ICD-10-CM

## 2022-01-24 PROCEDURE — 99204 OFFICE O/P NEW MOD 45 MIN: CPT | Mod: 95 | Performed by: INTERNAL MEDICINE

## 2022-01-24 NOTE — TELEPHONE ENCOUNTER
Caller: Writer called Charbel and left message to return call + sent NEWLINE SOFTWAREhart message    Procedure: EUS/COLON    Ordering Provider: Neftali Ochoa MD      Any Staff messages sent regarding case?: Yes    ----- Message from Soco Belcher RN sent at 1/24/2022  1:01 PM CST -----  Regarding: Lower EUS and Colon with Mallery to schedule  Hi    I had sent a message earlier today regarding an order for lower EUS with Don. Please add on a full colonoscopy to that, I have edited the gastro procedure order to reflect this as well.    Thanks  [place]

## 2022-01-24 NOTE — PROGRESS NOTES
Called to discuss with patient.  Lower EUS with MAC with Dr Ochoa     Explained they can expect a call from  for date and time of procedure, will need a , someone to stay with them for 24 hours and should stay in town for 24 hours (within 45 min of Hospital) post procedure    Patient needs to get pre-op physical completed. If outside  health system will need physical faxed to number [phone]   If you do not get a preop physical, your procedure could be cancelled, patient voiced understanding*    Preop Plan: pt understands needs to be within 30 days of procedure    Med Review    Blood thinner -  none  ASA - none  Diabetic - none    COVID test discussed: yes, within 4 days of procedure date    Patient Education r/t procedure: [facility]    A pre-op nurse will call 1-2 days prior to the procedure.     NPO/Prep: miralax bowel prep discussed, education sent via [facility]    Verbalized understanding of all instructions. All questions answered.      Gastro procedure order placed, message routed to endo     Soco Belcher, RN, BSN,   [facility]  Care coordinator
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
## 2022-01-24 NOTE — TELEPHONE ENCOUNTER
[place] -     See clinic note.    Please arrange for lower EUS with MAC with me in [facility].    ARNOL Ochoa MD  Professor of Medicine  Division of Gastroenterology, Hepatology and Nutrition  [facility]

## 2022-01-31 ENCOUNTER — TELEPHONE (OUTPATIENT)
Dept: GASTROENTEROLOGY | Facility: CLINIC | Age: 27
End: 2022-01-31
Payer: COMMERCIAL

## 2022-01-31 NOTE — TELEPHONE ENCOUNTER
FUTURE VISIT INFORMATION      SURGERY INFORMATION:    Date: 22    Location:  gi    Surgeon:  Neftali Ochoa MD    Consult: virtual visit     RECORDS REQUESTED FROM:       Primary Care Provider: Vince Bhatti    Most recent EKG+ Tracin21

## 2022-01-31 NOTE — TELEPHONE ENCOUNTER
Screening Questions  Blue=prep questions Red=location Green=sedation   1. Are you active on mychart? YES    2. What insurance is in the chart? PREFERRED ONE     3.  Ordering/Referring Provider: Neftali Ochoa MD    4. BMI 27.5, If greater than 40 review exclusion criteria also will need EXTENDED PREP    5.  Respiratory Screening (If yes to any of the following HOSPITAL setting only):     Do you use daily home oxygen? N  Do you have mod to severe Obstructive Sleep Apnea? N (can be seen at [facility] or hospital setting)    Do you have Pulmonary Hypertension? N   Do you have UNCONTROLLED asthma? N    6. Have you had a heart or lung transplant? N  (If yes, please review exclusion criteria)    7. Are you currently on dialysis?N  (If yes, schedule in HOSPITAL setting only)(If yes, please send Golytely prep)    8. Do you have chronic kidney disease? N (If yes, please send Golytely prep)    9. Have you had a stroke or Transient ischemic attack (TIA) within 6 months? N (If yes, do not schedule at [facility])    10. In the past 6 months, have you had any heart related issues including cardiomyopathy or heart attack? N (If yes, please review exclusion criteria)           If yes, did it require cardiac stenting or other implantable device?N  (If yes, please review exclusion criteria)      11. Do you have any implantable devices in your body (pacemaker, defib, LVAD)? N (If yes, schedule at [facility])    12. Do you take nitroglycerin? If yes, how often? N (if yes, schedule at HOSPITAL setting)    13. Are you currently taking any blood thinners?N (If yes- inform patient to follow up with PCP or provider for follow up instructions)     14. Are you a diabetic? N (If yes, please send Golytely prep)    15. (Females) Are you currently pregnant? NA  If yes, how many weeks?      16. Are you taking any prescription pain medications on a routine schedule? N If yes, MAC sedation and patient will need EXTENDED PREP.    17. Do you have any chemical  dependencies such as alcohol, street drugs, or methadone? N If yes, MAC sedation     18. Do you have any history of post-traumatic stress syndrome, severe anxiety or history of psychosis? N  If yes, MAC sedation.     19. Do you transfer independently? Y    20.  Do you have any issues with constipation? N   If yes, pt will need EXTENDED PREP     21. Preferred Pharmacy for Pre Prescription [facility] IN [place]    Scheduling Details    Which Colonoscopy Prep was Sent?:   Type of Procedure Scheduled: LOWER EUS  Surgeon: CANDI  Date of Procedure: 2/22/2022  Location: [facility]  Caller (Please ask for phone number if not scheduled by patient): [place]       Sedation Type: MAC  Conscious Sedation- Needs  for 6 hours after the procedure  MAC/General-Needs  for 24 hours after procedure    Pre-op Required at [facility], [place], [place] and OR for MAC sedation: Y  (if yes advise patient they will need a pre-op prior to procedure)      Informed patient they will need an adult  Y  Cannot take any type of public or medical transportation alone    Pre-Procedure Covid test to be completed at [facility] or Externally: Y    Confirmed Nurse will call to complete assessment Y    Additional comments:  ([facility] PATIENTS NEED EXTENDED PREP)

## 2022-02-02 ENCOUNTER — ANCILLARY PROCEDURE (OUTPATIENT)
Dept: GENERAL RADIOLOGY | Facility: CLINIC | Age: 27
End: 2022-02-02
Attending: PREVENTIVE MEDICINE
Payer: COMMERCIAL

## 2022-02-02 ENCOUNTER — OFFICE VISIT (OUTPATIENT)
Dept: ORTHOPEDICS | Facility: CLINIC | Age: 27
End: 2022-02-02
Payer: COMMERCIAL

## 2022-02-02 DIAGNOSIS — S32.020A COMPRESSION FRACTURE OF L2 VERTEBRA, INITIAL ENCOUNTER (H): ICD-10-CM

## 2022-02-02 DIAGNOSIS — M54.16 LUMBAR RADICULOPATHY: Primary | ICD-10-CM

## 2022-02-02 DIAGNOSIS — M54.16 LUMBAR RADICULOPATHY: ICD-10-CM

## 2022-02-02 PROCEDURE — 99213 OFFICE O/P EST LOW 20 MIN: CPT | Performed by: PREVENTIVE MEDICINE

## 2022-02-02 PROCEDURE — 72100 X-RAY EXAM L-S SPINE 2/3 VWS: CPT | Performed by: RADIOLOGY

## 2022-02-02 NOTE — LETTER
February 2, 2022      RE: Charbel Heard  [address] [facility] [address]        To whom it may concern:     Charbel Heard is under my professional care for a medical problem.  The patient can return to work as of 2/2/22 for up to 6 hours per day until I re-evaluate him on 2/25/22     When the patient returns to work, the following restrictions apply until 2/25/22  A) Bend and squat: Occasionally (6 hours), with use of lumbar support brace.     B) Walk/Stand: Occasionally up to 6 hours     E) Lift, carry, push, and pull no more than:  0-10 lbs.Light duty-unable to lift more than 10 pounds         Sincerely,        Rebel Meredith MD

## 2022-02-02 NOTE — LETTER
2/2/2022         RE: Charbel Heard  [address] [facility] [address]        Dear Colleague,    Thank you for referring your patient, Charbel Heard, to the [facility] [place]. Please see a copy of my visit note below.          [facility] FOLLOW-UP VISIT 2/2/2022    Charbel Heard's chief complaint for this visit includes:  Chief Complaint   Patient presents with     RECHECK     follow up low back pain     PCP: Vince Wall    Referring Provider:  No referring provider defined for this encounter.    There were no vitals taken for this visit.  Data Unavailable       Interval History:     Follow up reason: Low back pain    Date last seen: 1/19/2022    Following Therapeutic Plan: Yes     Pain: Unchanged    Function: Unchanged    Interval History: Has been icing, heating and resting. Notices pain with doing movement.      Medical History:    Any recent changes to your medical history? No    Any new medication prescribed since last visit? No    Review of Systems:    Do you have fever, chills, weight loss? No    Do you have any vision problems? No    Do you have any chest pain or edema? No    Do you have any shortness of breath or wheezing?  No    Do you have stomach problems? No    Do you have any urinary track issues? No    Do you have any numbness or focal weakness? No    Do you have diabetes? No    Do you have problems with bleeding or clotting? No    Do you have an rashes or other skin lesions? No    HISTORY OF PRESENT ILLNESS  Mr. Heard is a pleasant 26 year old year old male is here for L2 compression fracture  Not resolved  Still having some pain   Not having to use medications  Has not returned to work yet    MEDICAL HISTORY  Patient Active Problem List   Diagnosis     Tobacco use disorder       No current outpatient medications on file.       Allergies   Allergen Reactions     Augmentin Anaphylaxis     Suprax [Cefixime]  Anaphylaxis       Family History   Problem Relation Age of Onset     Anesthesia Reaction No family hx of      Deep Vein Thrombosis (DVT) No family hx of      Social History     Socioeconomic History     Marital status: Single     Spouse name: Not on file     Number of children: Not on file     Years of education: Not on file     Highest education level: Not on file   Occupational History     Not on file   Tobacco Use     Smoking status: Current Every Day Smoker     Packs/day: 1.00     Smokeless tobacco: Never Used   Vaping Use     Vaping Use: Never used   Substance and Sexual Activity     Alcohol use: Yes     Comment: Once/year     Drug use: No     Sexual activity: Yes     Partners: Female     Birth control/protection: Condom   Other Topics Concern     Not on file   Social History Narrative     Not on file     Social Determinants of Health     Financial Resource Strain: Not on file   Food Insecurity: Not on file   Transportation Needs: Not on file   Physical Activity: Not on file   Stress: Not on file   Social Connections: Not on file   Intimate Partner Violence: Not on file   Housing Stability: Not on file       Additional medical/Social/Surgical histories reviewed in [facility] and updated as appropriate.     REVIEW OF SYSTEMS (2/2/2022)  10 point ROS of systems including Constitutional, Eyes, Respiratory, Cardiovascular, Gastroenterology, Genitourinary, Integumentary, Musculoskeletal, Psychiatric, Allergic/Immunologic were all negative except for pertinent positives noted in my HPI.     PHYSICAL EXAM  VSS  General  - normal appearance, in no obvious distress  HEENT  - conjunctivae not injected, moist mucous membranes, normocephalic/atraumatic head, ears normal appearance, no lesions, mouth normal appearance, no scars, normal dentition and teeth present  CV  - normal peripheral perfusion  Pulm  - normal respiratory pattern, non-labored  Musculoskeletal - lumbar spine  - stance: normal gait without limp, no obvious leg  length discrepancy, normal heel and toe walk  - inspection: normal bone and joint alignment, no obvious scoliosis  - palpation: no paravertebral or bony tenderness, except over L2 vertebrae  - ROM: flexion somewhat exacerbates pain, normal extension, sidebending, rotation with some discomfort  - strength: lower extremities 5/5 in all planes    Neuro  - patellar and Achilles DTRs 2+ bilaterally, no lower extremity sensory deficit throughout L5 distribution, grossly normal coordination, normal muscle tone  Skin  - no ecchymosis, erythema, warmth, or induration, no obvious rash  Psych  - interactive, appropriate, normal mood and affect  ASSESSMENT & PLAN  25 yo male with lumbar compression fracture, stable, not resolved    I independently reviewed the following imaging studies:  Lumbar xray: shows stable L2 compression fracture  Overall not resolved  Discussed and ordered PT  Cont. Medications PRN  Appropriate PPE was utilized for prevention of spread of Covid-19.  Rebel Meredith MD, CAQSM        Again, thank you for allowing me to participate in the care of your patient.        Sincerely,        Rebel Meredith MD

## 2022-02-02 NOTE — PROGRESS NOTES
[facility] FOLLOW-UP VISIT 2/2/2022    Charbel Heard's chief complaint for this visit includes:  Chief Complaint   Patient presents with     RECHECK     follow up low back pain     PCP: Vince Wall    Referring Provider:  No referring provider defined for this encounter.    There were no vitals taken for this visit.  Data Unavailable       Interval History:     Follow up reason: Low back pain    Date last seen: 1/19/2022    Following Therapeutic Plan: Yes     Pain: Unchanged    Function: Unchanged    Interval History: Has been icing, heating and resting. Notices pain with doing movement.      Medical History:    Any recent changes to your medical history? No    Any new medication prescribed since last visit? No    Review of Systems:    Do you have fever, chills, weight loss? No    Do you have any vision problems? No    Do you have any chest pain or edema? No    Do you have any shortness of breath or wheezing?  No    Do you have stomach problems? No    Do you have any urinary track issues? No    Do you have any numbness or focal weakness? No    Do you have diabetes? No    Do you have problems with bleeding or clotting? No    Do you have an rashes or other skin lesions? No

## 2022-02-07 ENCOUNTER — PRE VISIT (OUTPATIENT)
Dept: SURGERY | Facility: CLINIC | Age: 27
End: 2022-02-07
Payer: COMMERCIAL

## 2022-02-07 ENCOUNTER — ANESTHESIA EVENT (OUTPATIENT)
Dept: SURGERY | Facility: CLINIC | Age: 27
End: 2022-02-07

## 2022-02-07 ENCOUNTER — VIRTUAL VISIT (OUTPATIENT)
Dept: SURGERY | Facility: CLINIC | Age: 27
End: 2022-02-07
Payer: COMMERCIAL

## 2022-02-07 DIAGNOSIS — Z01.818 PRE-OP EVALUATION: Primary | ICD-10-CM

## 2022-02-07 PROCEDURE — 99202 OFFICE O/P NEW SF 15 MIN: CPT | Mod: 95 | Performed by: PHYSICIAN ASSISTANT

## 2022-02-07 ASSESSMENT — PAIN SCALES - GENERAL: PAINLEVEL: NO PAIN (0)

## 2022-02-07 ASSESSMENT — LIFESTYLE VARIABLES: TOBACCO_USE: 1

## 2022-02-07 NOTE — PROGRESS NOTES
Charbel is a 26 year old who is being evaluated via a billable video visit.      How would you like to obtain your AVS? MyChart  If the video visit is dropped, the invitation should be resent by: Text to cell phone: [phone]      HPI         Review of Systems         Physical Exam
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
## 2022-02-07 NOTE — ANESTHESIA PREPROCEDURE EVALUATION
Anesthesia Pre-Procedure Evaluation    Patient: Charbel Heard   MRN: [id number] : 1995        Preoperative Diagnosis: * No surgery found *    Procedure :   Video Visit       No past medical history on file.   Past Surgical History:   Procedure Laterality Date     APPENDECTOMY       HERNIA REPAIR        Allergies   Allergen Reactions     Augmentin Anaphylaxis     Suprax [Cefixime] Anaphylaxis      Social History     Tobacco Use     Smoking status: Current Every Day Smoker     Packs/day: 1.00     Smokeless tobacco: Never Used   Substance Use Topics     Alcohol use: Yes     Comment: Once/year      Wt Readings from Last 1 Encounters:   21 73.5 kg (162 lb)        Anesthesia Evaluation   Pt has had prior anesthetic.     No history of anesthetic complications       ROS/MED HX  ENT/Pulmonary:     (+) tobacco use, Current use, 1 packs/day,     Neurologic:  - neg neurologic ROS     Cardiovascular:     (+) -----No previous cardiac testing  (-) taking anticoagulants/antiplatelets   METS/Exercise Tolerance: 3 - Able to walk 1-2 blocks without stopping Comment: limited activity due to L2 compression fracture in December.    Hematologic:  - neg hematologic  ROS  (-) history of blood clots and history of blood transfusion   Musculoskeletal:       GI/Hepatic:       Renal/Genitourinary:  - neg Renal ROS     Endo:  - neg endo ROS  (-) chronic steroid usage   Psychiatric/Substance Use:  - neg psychiatric ROS     Infectious Disease:  - neg infectious disease ROS     Malignancy:       Other:               OUTSIDE LABS:  CBC:   Lab Results   Component Value Date    WBC 6.2 2022    WBC 7.4 2016    HGB 15.8 2022    HGB 15.7 2016    HCT 46.9 2022    HCT 45.9 2016     2022     2016     BMP:   Lab Results   Component Value Date     2022    POTASSIUM 4.0 2022    CHLORIDE 106 2022    CO2 28 2022    BUN 6 (L) 2022    CR 0.75  01/13/2022     (H) 01/13/2022     COAGS: No results found for: PTT, INR, FIBR  POC: No results found for: BGM, HCG, HCGS  HEPATIC:   Lab Results   Component Value Date    ALBUMIN 3.9 01/13/2022    PROTTOTAL 8.0 01/13/2022    ALT 62 01/13/2022    AST 14 01/13/2022    ALKPHOS 108 01/13/2022    BILITOTAL 0.2 01/13/2022     OTHER:   Lab Results   Component Value Date    COSMO 9.0 01/13/2022             PAC Discussion and Assessment    ASA Classification: 2  ASC OK for Surgery: UUGI.  Anesthetic techniques and relevant risks discussed: MAC with GA as backup                  PAC Resident/NP Anesthesia Assessment: Charbel Heard is a 26-year-old male scheduled for colonoscopy and EUS on 2/22/22 by Dr. Heard in treatment of abnormal finding on imaging.  PAC referral for risk assessment and optimization for anesthesia with comorbid conditions of current tobacco use:      Pre-operative considerations:   1.  Cardiac:  Functional status- METS 3- recently, activity has been limited due to compression fracture 12/2021. Slowly increasing activity and can walk multiple blocks and ascend a flight of stairs without exertional symptoms. Denies cardiac symptoms. low risk surgery with 0.4% (RCRI #) risk of major adverse cardiac event.      2.  Pulm:   TAMIKO risk: low  ~smokes 1 PPD. Encouraged cessation and educated not to smoke day of procedure     3.  GI:  Risk of PONV score = 0.  If > 2, anti-emetic intervention recommended.      4.  abnormal finding on imaging with the above procedure planned      5. Msk: compression fracture 12/2021. Pain has been improving. Reports today will be his first day back at work.      VTE risk: 0.5%     Patient is optimized and is acceptable candidate for the proposed procedure.  No further diagnostic evaluation is needed.      Final plan per anesthesiologist on day of surgery.      **Physical exam and vital signs not completed today as this visit was scheduled as a virtual visit during Covid 19  pandemic. Physical exam should be completed the DOS in pre-op**     **For further details of assessment, testing, and physical exam please see H and P completed on same date.      RAMÍREZ Burch PA-C

## 2022-02-08 ENCOUNTER — DOCUMENTATION ONLY (OUTPATIENT)
Dept: LAB | Facility: OTHER | Age: 27
End: 2022-02-08
Payer: COMMERCIAL

## 2022-02-11 NOTE — PROGRESS NOTES
HISTORY OF PRESENT ILLNESS  Mr. Heard is a pleasant 26 year old year old male is here for L2 compression fracture  Not resolved  Still having some pain   Not having to use medications  Has not returned to work yet    MEDICAL HISTORY  Patient Active Problem List   Diagnosis     Tobacco use disorder       No current outpatient medications on file.       Allergies   Allergen Reactions     Augmentin Anaphylaxis     Suprax [Cefixime] Anaphylaxis       Family History   Problem Relation Age of Onset     Anesthesia Reaction No family hx of      Deep Vein Thrombosis (DVT) No family hx of      Social History     Socioeconomic History     Marital status: Single     Spouse name: Not on file     Number of children: Not on file     Years of education: Not on file     Highest education level: Not on file   Occupational History     Not on file   Tobacco Use     Smoking status: Current Every Day Smoker     Packs/day: 1.00     Smokeless tobacco: Never Used   Vaping Use     Vaping Use: Never used   Substance and Sexual Activity     Alcohol use: Yes     Comment: Once/year     Drug use: No     Sexual activity: Yes     Partners: Female     Birth control/protection: Condom   Other Topics Concern     Not on file   Social History Narrative     Not on file     Social Determinants of Health     Financial Resource Strain: Not on file   Food Insecurity: Not on file   Transportation Needs: Not on file   Physical Activity: Not on file   Stress: Not on file   Social Connections: Not on file   Intimate Partner Violence: Not on file   Housing Stability: Not on file       Additional medical/Social/Surgical histories reviewed in [facility] and updated as appropriate.     REVIEW OF SYSTEMS (2/2/2022)  10 point ROS of systems including Constitutional, Eyes, Respiratory, Cardiovascular, Gastroenterology, Genitourinary, Integumentary, Musculoskeletal, Psychiatric, Allergic/Immunologic were all negative except for pertinent positives noted in my HPI.      PHYSICAL EXAM  VSS  General  - normal appearance, in no obvious distress  HEENT  - conjunctivae not injected, moist mucous membranes, normocephalic/atraumatic head, ears normal appearance, no lesions, mouth normal appearance, no scars, normal dentition and teeth present  CV  - normal peripheral perfusion  Pulm  - normal respiratory pattern, non-labored  Musculoskeletal - lumbar spine  - stance: normal gait without limp, no obvious leg length discrepancy, normal heel and toe walk  - inspection: normal bone and joint alignment, no obvious scoliosis  - palpation: no paravertebral or bony tenderness, except over L2 vertebrae  - ROM: flexion somewhat exacerbates pain, normal extension, sidebending, rotation with some discomfort  - strength: lower extremities 5/5 in all planes    Neuro  - patellar and Achilles DTRs 2+ bilaterally, no lower extremity sensory deficit throughout L5 distribution, grossly normal coordination, normal muscle tone  Skin  - no ecchymosis, erythema, warmth, or induration, no obvious rash  Psych  - interactive, appropriate, normal mood and affect  ASSESSMENT & PLAN  25 yo male with lumbar compression fracture, stable, not resolved    I independently reviewed the following imaging studies:  Lumbar xray: shows stable L2 compression fracture  Overall not resolved  Discussed and ordered PT  Cont. Medications PRN  Appropriate PPE was utilized for prevention of spread of Covid-19.  Rebel Meredith MD, CAQSM

## 2022-02-16 ENCOUNTER — TELEPHONE (OUTPATIENT)
Dept: GASTROENTEROLOGY | Facility: CLINIC | Age: 27
End: 2022-02-16

## 2022-02-16 DIAGNOSIS — Z11.59 ENCOUNTER FOR SCREENING FOR OTHER VIRAL DISEASES: Primary | ICD-10-CM

## 2022-02-16 NOTE — TELEPHONE ENCOUNTER
Attempted to contact patient regarding upcoming Lower EUS procedure on 2/22/22 for pre assessment questions. No answer.     Left message to return call to [phone] #2    Covid test scheduled: 2/18/22    Pre op exam scheduled: 2/7/22 - PAC with Marilyn Cunningham PA-C    Arrival time: 0830    Facility location: [facility]    Sedation type: MAC    Indication for procedure: pre-sacral/pericolonic pelvis on CT    Anticoagulants: no.     Bowel prep recommendation: Miralax/Magnesium citrate/Dulcolax     Cha Bell RN

## 2022-02-18 ENCOUNTER — LAB (OUTPATIENT)
Dept: LAB | Facility: OTHER | Age: 27
End: 2022-02-18
Payer: COMMERCIAL

## 2022-02-18 DIAGNOSIS — Z11.59 ENCOUNTER FOR SCREENING FOR OTHER VIRAL DISEASES: ICD-10-CM

## 2022-02-18 PROCEDURE — U0005 INFEC AGEN DETEC AMPLI PROBE: HCPCS

## 2022-02-18 PROCEDURE — U0003 INFECTIOUS AGENT DETECTION BY NUCLEIC ACID (DNA OR RNA); SEVERE ACUTE RESPIRATORY SYNDROME CORONAVIRUS 2 (SARS-COV-2) (CORONAVIRUS DISEASE [COVID-19]), AMPLIFIED PROBE TECHNIQUE, MAKING USE OF HIGH THROUGHPUT TECHNOLOGIES AS DESCRIBED BY CMS-2020-01-R: HCPCS

## 2022-02-19 LAB — SARS-COV-2 RNA RESP QL NAA+PROBE: NEGATIVE

## 2022-02-21 NOTE — TELEPHONE ENCOUNTER
Second attempt for pre-assessment prior to upcoming lower EUS.    No answer.  Left message to return call [phone] #2    Emeli Begum RN

## 2022-02-22 ENCOUNTER — ANESTHESIA (OUTPATIENT)
Dept: GASTROENTEROLOGY | Facility: CLINIC | Age: 27
End: 2022-02-22
Payer: COMMERCIAL

## 2022-02-22 ENCOUNTER — HOSPITAL ENCOUNTER (OUTPATIENT)
Facility: CLINIC | Age: 27
Discharge: HOME | End: 2022-02-22
Attending: INTERNAL MEDICINE | Admitting: INTERNAL MEDICINE
Payer: COMMERCIAL

## 2022-02-22 ENCOUNTER — ANESTHESIA EVENT (OUTPATIENT)
Dept: GASTROENTEROLOGY | Facility: CLINIC | Age: 27
End: 2022-02-22
Payer: COMMERCIAL

## 2022-02-22 VITALS
WEIGHT: 169.97 LBS | SYSTOLIC BLOOD PRESSURE: 130 MMHG | HEIGHT: 60 IN | HEART RATE: 68 BPM | TEMPERATURE: 98.1 F | DIASTOLIC BLOOD PRESSURE: 71 MMHG | RESPIRATION RATE: 16 BRPM | OXYGEN SATURATION: 100 % | BODY MASS INDEX: 33.37 KG/M2

## 2022-02-22 LAB — LOWER EUS: NORMAL

## 2022-02-22 PROCEDURE — 250N000009 HC RX 250: Performed by: NURSE ANESTHETIST, CERTIFIED REGISTERED

## 2022-02-22 PROCEDURE — 88305 TISSUE EXAM BY PATHOLOGIST: CPT | Mod: TC | Performed by: INTERNAL MEDICINE

## 2022-02-22 PROCEDURE — 250N000011 HC RX IP 250 OP 636: Performed by: NURSE ANESTHETIST, CERTIFIED REGISTERED

## 2022-02-22 PROCEDURE — 88305 TISSUE EXAM BY PATHOLOGIST: CPT | Mod: 26 | Performed by: PATHOLOGY

## 2022-02-22 PROCEDURE — 45385 COLONOSCOPY W/LESION REMOVAL: CPT | Mod: XU | Performed by: INTERNAL MEDICINE

## 2022-02-22 PROCEDURE — 45391 COLONOSCOPY W/ENDOSCOPE US: CPT | Performed by: INTERNAL MEDICINE

## 2022-02-22 PROCEDURE — 88172 CYTP DX EVAL FNA 1ST EA SITE: CPT | Mod: 26 | Performed by: PATHOLOGY

## 2022-02-22 PROCEDURE — 258N000003 HC RX IP 258 OP 636: Performed by: NURSE ANESTHETIST, CERTIFIED REGISTERED

## 2022-02-22 PROCEDURE — 45392 COLONOSCOPY W/ENDOSCOPIC FNB: CPT | Performed by: INTERNAL MEDICINE

## 2022-02-22 PROCEDURE — 88173 CYTOPATH EVAL FNA REPORT: CPT | Mod: TC | Performed by: INTERNAL MEDICINE

## 2022-02-22 PROCEDURE — 88173 CYTOPATH EVAL FNA REPORT: CPT | Mod: 26 | Performed by: PATHOLOGY

## 2022-02-22 PROCEDURE — 88184 FLOWCYTOMETRY/ TC 1 MARKER: CPT | Performed by: INTERNAL MEDICINE

## 2022-02-22 PROCEDURE — 88184 FLOWCYTOMETRY/ TC 1 MARKER: CPT | Performed by: PATHOLOGY

## 2022-02-22 PROCEDURE — 370N000017 HC ANESTHESIA TECHNICAL FEE, PER MIN: Performed by: INTERNAL MEDICINE

## 2022-02-22 PROCEDURE — 45380 COLONOSCOPY AND BIOPSY: CPT | Performed by: INTERNAL MEDICINE

## 2022-02-22 PROCEDURE — 88189 FLOWCYTOMETRY/READ 16 & >: CPT | Performed by: PATHOLOGY

## 2022-02-22 PROCEDURE — 272N000574 HC SNARE ADDITIONAL: Performed by: INTERNAL MEDICINE

## 2022-02-22 PROCEDURE — 272N000510: Performed by: INTERNAL MEDICINE

## 2022-02-22 PROCEDURE — 88185 FLOWCYTOMETRY/TC ADD-ON: CPT | Performed by: INTERNAL MEDICINE

## 2022-02-22 RX ORDER — SODIUM CHLORIDE, SODIUM LACTATE, POTASSIUM CHLORIDE, CALCIUM CHLORIDE 600; 310; 30; 20 MG/100ML; MG/100ML; MG/100ML; MG/100ML
INJECTION, SOLUTION INTRAVENOUS CONTINUOUS PRN
Status: DISCONTINUED | OUTPATIENT
Start: 2022-02-22 | End: 2022-02-22

## 2022-02-22 RX ORDER — PROPOFOL 10 MG/ML
INJECTION, EMULSION INTRAVENOUS CONTINUOUS PRN
Status: DISCONTINUED | OUTPATIENT
Start: 2022-02-22 | End: 2022-02-22

## 2022-02-22 RX ORDER — ONDANSETRON 2 MG/ML
INJECTION INTRAMUSCULAR; INTRAVENOUS PRN
Status: DISCONTINUED | OUTPATIENT
Start: 2022-02-22 | End: 2022-02-22

## 2022-02-22 RX ORDER — FENTANYL CITRATE 50 UG/ML
INJECTION, SOLUTION INTRAMUSCULAR; INTRAVENOUS PRN
Status: DISCONTINUED | OUTPATIENT
Start: 2022-02-22 | End: 2022-02-22

## 2022-02-22 RX ORDER — ONDANSETRON 4 MG/1
4 TABLET, ORALLY DISINTEGRATING ORAL EVERY 6 HOURS PRN
Status: DISCONTINUED | OUTPATIENT
Start: 2022-02-22 | End: 2022-02-22 | Stop reason: HOSPADM

## 2022-02-22 RX ORDER — NALOXONE HYDROCHLORIDE 0.4 MG/ML
0.4 INJECTION, SOLUTION INTRAMUSCULAR; INTRAVENOUS; SUBCUTANEOUS
Status: DISCONTINUED | OUTPATIENT
Start: 2022-02-22 | End: 2022-02-22 | Stop reason: HOSPADM

## 2022-02-22 RX ORDER — LIDOCAINE HYDROCHLORIDE 20 MG/ML
INJECTION, SOLUTION INFILTRATION; PERINEURAL PRN
Status: DISCONTINUED | OUTPATIENT
Start: 2022-02-22 | End: 2022-02-22

## 2022-02-22 RX ORDER — ONDANSETRON 2 MG/ML
4 INJECTION INTRAMUSCULAR; INTRAVENOUS EVERY 6 HOURS PRN
Status: DISCONTINUED | OUTPATIENT
Start: 2022-02-22 | End: 2022-02-22 | Stop reason: HOSPADM

## 2022-02-22 RX ORDER — NALOXONE HYDROCHLORIDE 0.4 MG/ML
0.2 INJECTION, SOLUTION INTRAMUSCULAR; INTRAVENOUS; SUBCUTANEOUS
Status: DISCONTINUED | OUTPATIENT
Start: 2022-02-22 | End: 2022-02-22 | Stop reason: HOSPADM

## 2022-02-22 RX ORDER — LIDOCAINE 40 MG/G
CREAM TOPICAL
Status: DISCONTINUED | OUTPATIENT
Start: 2022-02-22 | End: 2022-02-22 | Stop reason: HOSPADM

## 2022-02-22 RX ORDER — FLUMAZENIL 0.1 MG/ML
0.2 INJECTION, SOLUTION INTRAVENOUS
Status: DISCONTINUED | OUTPATIENT
Start: 2022-02-22 | End: 2022-02-22 | Stop reason: HOSPADM

## 2022-02-22 RX ADMIN — FENTANYL CITRATE 50 MCG: 50 INJECTION, SOLUTION INTRAMUSCULAR; INTRAVENOUS at 10:17

## 2022-02-22 RX ADMIN — PROPOFOL 125 MCG/KG/MIN: 10 INJECTION, EMULSION INTRAVENOUS at 10:19

## 2022-02-22 RX ADMIN — ONDANSETRON 4 MG: 2 INJECTION INTRAMUSCULAR; INTRAVENOUS at 10:17

## 2022-02-22 RX ADMIN — MIDAZOLAM 2 MG: 1 INJECTION INTRAMUSCULAR; INTRAVENOUS at 10:11

## 2022-02-22 RX ADMIN — PROPOFOL 20 MG: 10 INJECTION, EMULSION INTRAVENOUS at 10:22

## 2022-02-22 RX ADMIN — SODIUM CHLORIDE, POTASSIUM CHLORIDE, SODIUM LACTATE AND CALCIUM CHLORIDE: 600; 310; 30; 20 INJECTION, SOLUTION INTRAVENOUS at 10:11

## 2022-02-22 RX ADMIN — LIDOCAINE HYDROCHLORIDE 60 MG: 20 INJECTION, SOLUTION INFILTRATION; PERINEURAL at 10:17

## 2022-02-22 NOTE — OR NURSING
Pt underwent colonoscopy with polypectomy and lower ultrasound with FNA under MAC. Pt transferred to recovery and report given to 3C RN.       Natalia Nino, RN

## 2022-02-22 NOTE — ANESTHESIA CARE TRANSFER NOTE
Patient: Charbel Heard    Procedure: Procedure(s):  COLONOSCOPY, WITH ENDOSCOPIC ULTRASOUND GUIDANCE  COLONOSCOPY, WITH POLYPECTOMY AND BIOPSY       Diagnosis: Abnormal finding on imaging [R93.89]  Diagnosis Additional Information: No value filed.    Anesthesia Type:   No value filed.     Note:    Oropharynx: oropharynx clear of all foreign objects and spontaneously breathing  Level of Consciousness: awake  Oxygen Supplementation: room air    Independent Airway: airway patency satisfactory and stable  Dentition: dentition unchanged  Vital Signs Stable: post-procedure vital signs reviewed and stable  Report to RN Given: handoff report given  Patient transferred to: Phase II  Comments: VSS, report given to RN.    Handoff Report: Identifed the Patient, Identified the Reponsible Provider, Reviewed the pertinent medical history, Discussed the surgical course, Reviewed Intra-OP anesthesia mangement and issues during anesthesia, Set expectations for post-procedure period and Allowed opportunity for questions and acknowledgement of understanding      Vitals:  Vitals Value Taken Time   /71    Temp     Pulse 85    Resp 14    SpO2 99        Electronically Signed By: ISAURA Galeas CRNA  February 22, 2022  11:56 AM

## 2022-02-22 NOTE — DISCHARGE INSTRUCTIONS
Colonoscopy  Colonoscopy is a test to view the inside of your lower digestive tract (colon and rectum). Sometimes it can show the last part of the small intestine (ileum). During the test, small pieces of tissue may be removed for testing. This is called a biopsy. Small growths, such as polyps, may also be removed.       A camera attached to a flexible tube with a viewing lens is used to take video pictures.   Why is colonoscopy done?  The test is done to help look for colon cancer. And it can help find the source of abdominal pain, bleeding, and changes in bowel habits. It may be needed once a year to every 10 years, depending on factors such as your:     Age    Health history    Family health history    Symptoms    Results from any prior colonoscopy  Risks and possible complications  These include:    Bleeding                 A puncture or tear in the colon     Risks of anesthesia    A cancer lesion not being seen or fully removed  Getting ready   To prepare for the test:    Talk with your healthcare provider about the risks of the test (see below). Also ask your healthcare provider about alternatives to the test.    Tell your healthcare provider about any medicines and supplements you take. Also tell him or her about any health conditions you may have.    Make sure your rectum and colon are empty for the test. Follow the diet and bowel prep instructions exactly. If you don t, the test may need to be rescheduled.    Plan for a friend or family member to drive you home after the test.    You may discuss the results with your doctor right away or at a future visit.   During the test   The test is usually done in the hospital on an outpatient basis or at an outpatient clinic. This means you go home the same day. The procedure takes about 30 minutes. During that time:     You are given relaxing (sedating) medicine through an IV line. You may be drowsy, or fully asleep.    The healthcare provider will first give you  a physical exam to check for anal and rectal problems.    Then the anus is lubricated and the scope inserted.    If you are awake, you may have a feeling similar to needing to have a bowel movement. You may also feel pressure as air is pumped into the colon. It s OK to pass gas during the procedure.    Biopsy, polyp removal, or other treatments may be done during the test.     Colonoscopy provides an inside view of the entire colon.   After the test   You may have gas right after the test. It can help to try to pass it to help prevent later bloating. Your healthcare provider may discuss the results with you right away. Or you may need to schedule a follow-up visit to talk about the results. After the test, you can go back to your normal eating and other activities. You may be tired from the sedation and need to rest for a few hours. Discuss your medicines with your provider to understand if they can be restarted right away.   When to call your healthcare provider  Call your healthcare provider if you have any of the following after the procedure:     Pain in your belly    Fever of 100.4 F (38 C) or higher, or as directed by your provider    Rectal bleeding    Nausea or vomiting  Merlin [employer] last reviewed this educational content on 6/1/2019 2000-2021 [employer]. All rights reserved. This information is not intended as a substitute for professional medical care. Always follow your healthcare professional's instructions.        Endoscopic Ultrasound (EUS)  An endoscopic ultrasound (EUS) is a test to look at the inside of your gastrointestinal (GI) tract. It's commonly used to look for cancers or growths in the esophagus, stomach, pancreas, liver, and rectum. It can help to stage cancer (to see how advanced a cancer is). EUS may also be used to help diagnose certain diseases or to drain cysts or abscesses.     What is EUS?  EUS shows both ultrasound images and live video of the GI tract. During the test,  a flexible tube called an endoscope (scope) is used. At the end of the scope is a tiny video camera and light. The video camera sends live images to a monitor. The scope also contains a very small ultrasound device. This uses sound waves to create images and send them to a monitor.   A needle is passed through the scope. The needle can be used take a small sample of tissue for testing. This is called a biopsy. The needle can be used to take a sample of fluid. This is called fine-needle aspiration (FNA).   Risks and possible complications  Risks and possible complications include the following:     Bleeding    Infection    A hole (perforation) in the digestive tract    Risks of sedation or anesthesia  Before the test  Be prepared before the test:    Tell your healthcare provider what medicine you take. This includes vitamins, herbs, and over-the-counter medicine. It also includes all prescribed medicines, such as any blood thinners like warfarin, clopidogrel, ibuprofen, or daily aspirin. Ask your healthcare provider if you need to stop taking some or all of them before the test.    You may be prescribed antibiotics to take before or after the test. This depends on the area being studied and what is done during the test. These medicines help prevent infection.    Carefully follow the instructions for preparing for the test to make sure results are accurate. Instructions may include:  ? If you re having an EUS of the upper GI tract (esophagus, stomach, duodenum, pancreas, liver):    Don't eat or drink for 6 hours before the test.  ? If you re having an EUS of the lower GI tract (rectum):    Before the test, do bowel prep as instructed to clean your rectum of stool. This may involve a clear liquid diet and using a laxative (liquid or pills) the night before the test. Or it may mean doing one or more enemas the morning of the test.    Don't eat or drink for 6 hours before the test.    Be sure to arrive on time at the  facility. Bring your identification and health insurance card. Leave valuables at home. If you have them, bring X-rays or other test results with you.  Tell your healthcare provider   For your safety, tell the healthcare provider if you:     Take insulin. Your dose may need to be changed on the day of your test.    Are allergic to latex.    Have any other allergies.    Are taking blood thinners.  During the test  An endoscopic ultrasound usually takes place in a hospital. The procedure itself may take 1 to 2 hours. You will likely go home soon afterward. During the test:     You lie on your left side on an exam table.    An intravenous (IV) line will be put into a vein in your arm or hand. This line supplies fluids and medicines. To keep you comfortable during the test, you will be given a sedative medicine. This medicine prevents discomfort and will make you sleepy.    If you are having an EUS of the upper GI tract, local anesthetic may be sprayed in your throat. This will help you be more comfortable as the healthcare provider inserts the scope. The healthcare provider then gently puts the flexible scope into your mouth or nose and down your throat.    If you re having an EUS of the lower GI tract, the healthcare provider gently puts the flexible scope into your anus.    During the test, the scope sends live video and ultrasound images from inside your body to nearby monitors. These are used to examine your GI tract. Specialized procedures, such as drainage, are done as needed.    The healthcare provider may discuss the results with you soon after the test. Biopsy results take  several days.    In most cases, you can go home within a few hours of the test. When you leave the facility, have an adult family member or friend drive you, even if you don't feel that sleepy.  After the test  Here is what to expect after the test:     You may feel tired from the sedative. This should wear off by the end of the day.    If  you had an upper digestive endoscopy, your throat may feel sore for a day or two. Over-the-counter sore throat lozenges and spray should help.    You can eat and drink normally as soon as the test is done.  When to call your healthcare provider  Call your healthcare provider if you notice any of the following:     Fever of 100.4  F ( 38.0  C) or higher, or as advised by your healthcare provider    Shortness of breath    Vomiting blood, blood in stool, or black stools    Coughing or hoarse voice that won t go away    Worsening abdominal pain  Fernando last reviewed this educational content on 6/1/2019 2000-2021 [employer]. All rights reserved. This information is not intended as a substitute for professional medical care. Always follow your healthcare professional's instructions.

## 2022-02-23 ENCOUNTER — OFFICE VISIT (OUTPATIENT)
Dept: ORTHOPEDICS | Facility: CLINIC | Age: 27
End: 2022-02-23
Payer: COMMERCIAL

## 2022-02-23 VITALS — WEIGHT: 170 LBS | HEIGHT: 60 IN | BODY MASS INDEX: 33.38 KG/M2

## 2022-02-23 DIAGNOSIS — M54.16 LUMBAR RADICULOPATHY: ICD-10-CM

## 2022-02-23 DIAGNOSIS — S32.020A COMPRESSION FRACTURE OF L2 VERTEBRA, INITIAL ENCOUNTER (H): Primary | ICD-10-CM

## 2022-02-23 LAB

## 2022-02-23 PROCEDURE — 99214 OFFICE O/P EST MOD 30 MIN: CPT | Performed by: PREVENTIVE MEDICINE

## 2022-02-23 RX ORDER — DICLOFENAC SODIUM 75 MG/1
75 TABLET, DELAYED RELEASE ORAL 2 TIMES DAILY PRN
Qty: 40 TABLET | Refills: 1 | Status: SHIPPED | OUTPATIENT
Start: 2022-02-23 | End: 2024-02-16

## 2022-02-23 ASSESSMENT — LIFESTYLE VARIABLES: TOBACCO_USE: 1

## 2022-02-23 NOTE — LETTER
2/23/2022         RE: Charbel Heard  [address] [facility] [address]        Dear Colleague,    Thank you for referring your patient, Charbel Heard, to the [facility] [place]. Please see a copy of my visit note below.    HISTORY OF PRESENT ILLNESS  Mr. Heard is a pleasant 26 year old year old male who presents to clinic today with   Charbel explains that overall he is feeling better  Still having pain with work and standing and bending  Working 6 hours shifts with lifting restrictions  Doing ok  Hurts after work for the rest of the night  Using occasional ibuprofen  Has not yet started PT as suggested    Location: mid/low back  Quality:  achy pain    Severity:4/10 at worst    Duration: since injury 3 months prior compression fracture L1  Timing: occurs intermittently  Context: occurs while exercising and lifting, working  Modifying factors:  resting and non-use makes it better, movement and use makes it worse  Associated signs & symptoms: no radiation into legs    MEDICAL HISTORY  Patient Active Problem List   Diagnosis     Tobacco use disorder       No current outpatient medications on file.       Allergies   Allergen Reactions     Augmentin Anaphylaxis     Suprax [Cefixime] Anaphylaxis       Family History   Problem Relation Age of Onset     Anesthesia Reaction No family hx of      Deep Vein Thrombosis (DVT) No family hx of      Social History     Socioeconomic History     Marital status: Single     Spouse name: Not on file     Number of children: Not on file     Years of education: Not on file     Highest education level: Not on file   Occupational History     Not on file   Tobacco Use     Smoking status: Current Every Day Smoker     Packs/day: 1.00     Smokeless tobacco: Never Used   Vaping Use     Vaping Use: Never used   Substance and Sexual Activity     Alcohol use: Yes     Comment: Once/year     Drug use: No     Sexual activity: Yes     Partners:  Female     Birth control/protection: Condom   Other Topics Concern     Not on file   Social History Narrative     Not on file     Social Determinants of Health     Financial Resource Strain: Not on file   Food Insecurity: Not on file   Transportation Needs: Not on file   Physical Activity: Not on file   Stress: Not on file   Social Connections: Not on file   Intimate Partner Violence: Not on file   Housing Stability: Not on file       Additional medical/Social/Surgical histories reviewed in [facility] and updated as appropriate.     REVIEW OF SYSTEMS (2/23/2022)  10 point ROS of systems including Constitutional, Eyes, Respiratory, Cardiovascular, Gastroenterology, Genitourinary, Integumentary, Musculoskeletal, Psychiatric, Allergic/Immunologic were all negative except for pertinent positives noted in my HPI.     PHYSICAL EXAM  Vitals:    02/23/22 1329   Weight: 77.1 kg (170 lb)   Height: 1.524 m (5')     Vital Signs: Ht 1.524 m (5')   Wt 77.1 kg (170 lb)   BMI 33.20 kg/m   Patient declined being weighed. Body mass index is 33.2 kg/m .    General  - normal appearance, in no obvious distress  HEENT  - conjunctivae not injected, moist mucous membranes, normocephalic/atraumatic head, ears normal appearance, no lesions, mouth normal appearance, no scars, normal dentition and teeth present  CV  - normal peripheral perfusion  Pulm  - normal respiratory pattern, non-labored  Musculoskeletal - lumbar spine  - stance: normal gait without limp, no obvious leg length discrepancy, normal heel and toe walk  - inspection: normal bone and joint alignment, no obvious scoliosis  - palpation: no paravertebral or bony tenderness, minimal over L1 paraspinal muscles  - ROM: flexion slighlty exacerbates pain, normal extension, sidebending, rotation  - strength: lower extremities 5/5 in all planes  -  Neuro  - patellar and Achilles DTRs 2+ bilaterally, NO lower extremity sensory deficit throughout L5 distribution, grossly normal coordination,  normal muscle tone  Skin  - no ecchymosis, erythema, warmth, or induration, no obvious rash  Psych  - interactive, appropriate, normal mood and affect  ASSESSMENT & PLAN  25 yo male with L1 compression fracture, healed, stable    I independently reviewed the following imaging studies:  xrays lumbar spine: shows stable, healed compression L1 fracture  Cont. HEP  Start PT  RX given for voltaren  F/u in 6 weeks  Given work note to start advancing hours for work  Cont. Lifting restrictions    Appropriate PPE was utilized for prevention of spread of Covid-19.  Rebel Meredith MD, CAQSM        Again, thank you for allowing me to participate in the care of your patient.        Sincerely,        Rebel Meredith MD

## 2022-02-23 NOTE — ANESTHESIA PREPROCEDURE EVALUATION
Anesthesia Pre-Procedure Evaluation    Patient: Charbel Heard   MRN: [id number] : 1995        Procedure : Procedure(s):  COLONOSCOPY, WITH ENDOSCOPIC ULTRASOUND GUIDANCE  COLONOSCOPY, WITH POLYPECTOMY AND BIOPSY          History reviewed. No pertinent past medical history.   Past Surgical History:   Procedure Laterality Date     APPENDECTOMY       COLONOSCOPY N/A 2022    Procedure: COLONOSCOPY, WITH POLYPECTOMY AND BIOPSY;  Surgeon: Neftali Ochoa MD;  Location: [facility]     COLONOSCOPY, WITH ENDOSCOPIC ULTRASOUND GUIDANCE N/A 2022    Procedure: COLONOSCOPY, WITH ENDOSCOPIC ULTRASOUND GUIDANCE;  Surgeon: Neftali Ochoa MD;  Location: [facility]     HERNIA REPAIR        Allergies   Allergen Reactions     Augmentin Anaphylaxis     Suprax [Cefixime] Anaphylaxis      Social History     Tobacco Use     Smoking status: Current Every Day Smoker     Packs/day: 1.00     Smokeless tobacco: Never Used   Substance Use Topics     Alcohol use: Yes     Comment: Once/year      Wt Readings from Last 1 Encounters:   22 77.1 kg (170 lb)        Anesthesia Evaluation   Pt has had prior anesthetic.     No history of anesthetic complications       ROS/MED HX  ENT/Pulmonary:     (+) tobacco use, Current use,     Neurologic:  - neg neurologic ROS     Cardiovascular:  - neg cardiovascular ROS     METS/Exercise Tolerance:     Hematologic:  - neg hematologic  ROS     Musculoskeletal:  - neg musculoskeletal ROS     GI/Hepatic:  - neg GI/hepatic ROS     Renal/Genitourinary:  - neg Renal ROS     Endo:  - neg endo ROS     Psychiatric/Substance Use:  - neg psychiatric ROS     Infectious Disease:  - neg infectious disease ROS     Malignancy:  - neg malignancy ROS     Other:  - neg other ROS          Physical Exam    Airway  airway exam normal           Respiratory Devices and Support         Dental  no notable dental history         Cardiovascular   cardiovascular exam normal          Pulmonary   pulmonary exam  normal                OUTSIDE LABS:  CBC:   Lab Results   Component Value Date    WBC 6.2 01/13/2022    WBC 7.4 08/16/2016    HGB 15.8 01/13/2022    HGB 15.7 08/16/2016    HCT 46.9 01/13/2022    HCT 45.9 08/16/2016     01/13/2022     08/16/2016     BMP:   Lab Results   Component Value Date     01/13/2022    POTASSIUM 4.0 01/13/2022    CHLORIDE 106 01/13/2022    CO2 28 01/13/2022    BUN 6 (L) 01/13/2022    CR 0.75 01/13/2022     (H) 01/13/2022     COAGS: No results found for: PTT, INR, FIBR  POC: No results found for: BGM, HCG, HCGS  HEPATIC:   Lab Results   Component Value Date    ALBUMIN 3.9 01/13/2022    PROTTOTAL 8.0 01/13/2022    ALT 62 01/13/2022    AST 14 01/13/2022    ALKPHOS 108 01/13/2022    BILITOTAL 0.2 01/13/2022     OTHER:   Lab Results   Component Value Date    COSMO 9.0 01/13/2022       Anesthesia Plan    ASA Status:  2   NPO Status:  NPO Appropriate    Anesthesia Type: MAC.     - Reason for MAC: straight local not clinically adequate   Induction: N/a.   Maintenance: TIVA.        Consents    Anesthesia Plan(s) and associated risks, benefits, and realistic alternatives discussed. Questions answered and patient/representative(s) expressed understanding.    - Discussed:     - Discussed with:  Patient         Postoperative Care       PONV prophylaxis: Ondansetron (or other 5HT-3)     Comments:                Malvin Esteves MD

## 2022-02-23 NOTE — ANESTHESIA POSTPROCEDURE EVALUATION
Patient: Charbel Heard    Procedure: Procedure(s):  COLONOSCOPY, WITH ENDOSCOPIC ULTRASOUND GUIDANCE  COLONOSCOPY, WITH POLYPECTOMY AND BIOPSY       Anesthesia Type:  MAC    Note:  Disposition: Outpatient   Postop Pain Control: Uneventful            Sign Out: Well controlled pain   PONV: No   Neuro/Psych: Uneventful            Sign Out: Acceptable/Baseline neuro status   Airway/Respiratory: Uneventful            Sign Out: Acceptable/Baseline resp. status   CV/Hemodynamics: Uneventful            Sign Out: Acceptable CV status; No obvious hypovolemia; No obvious fluid overload   Other NRE: NONE   DID A NON-ROUTINE EVENT OCCUR? No           Last vitals:  Vitals Value Taken Time   /71 02/22/22 1205   Temp     Pulse 68 02/22/22 1205   Resp 16 02/22/22 1205   SpO2 100 % 02/22/22 1205       Electronically Signed By: Malvin Esteves MD  February 23, 2022  3:37 PM

## 2022-02-23 NOTE — PROGRESS NOTES
HISTORY OF PRESENT ILLNESS  Mr. Heard is a pleasant 26 year old year old male who presents to clinic today with   Charbel explains that overall he is feeling better  Still having pain with work and standing and bending  Working 6 hours shifts with lifting restrictions  Doing ok  Hurts after work for the rest of the night  Using occasional ibuprofen  Has not yet started PT as suggested    Location: mid/low back  Quality:  achy pain    Severity:4/10 at worst    Duration: since injury 3 months prior compression fracture L1  Timing: occurs intermittently  Context: occurs while exercising and lifting, working  Modifying factors:  resting and non-use makes it better, movement and use makes it worse  Associated signs & symptoms: no radiation into legs    MEDICAL HISTORY  Patient Active Problem List   Diagnosis     Tobacco use disorder       No current outpatient medications on file.       Allergies   Allergen Reactions     Augmentin Anaphylaxis     Suprax [Cefixime] Anaphylaxis       Family History   Problem Relation Age of Onset     Anesthesia Reaction No family hx of      Deep Vein Thrombosis (DVT) No family hx of      Social History     Socioeconomic History     Marital status: Single     Spouse name: Not on file     Number of children: Not on file     Years of education: Not on file     Highest education level: Not on file   Occupational History     Not on file   Tobacco Use     Smoking status: Current Every Day Smoker     Packs/day: 1.00     Smokeless tobacco: Never Used   Vaping Use     Vaping Use: Never used   Substance and Sexual Activity     Alcohol use: Yes     Comment: Once/year     Drug use: No     Sexual activity: Yes     Partners: Female     Birth control/protection: Condom   Other Topics Concern     Not on file   Social History Narrative     Not on file     Social Determinants of Health     Financial Resource Strain: Not on file   Food Insecurity: Not on file   Transportation Needs: Not on file   Physical  Activity: Not on file   Stress: Not on file   Social Connections: Not on file   Intimate Partner Violence: Not on file   Housing Stability: Not on file       Additional medical/Social/Surgical histories reviewed in [facility] and updated as appropriate.     REVIEW OF SYSTEMS (2/23/2022)  10 point ROS of systems including Constitutional, Eyes, Respiratory, Cardiovascular, Gastroenterology, Genitourinary, Integumentary, Musculoskeletal, Psychiatric, Allergic/Immunologic were all negative except for pertinent positives noted in my HPI.     PHYSICAL EXAM  Vitals:    02/23/22 1329   Weight: 77.1 kg (170 lb)   Height: 1.524 m (5')     Vital Signs: Ht 1.524 m (5')   Wt 77.1 kg (170 lb)   BMI 33.20 kg/m   Patient declined being weighed. Body mass index is 33.2 kg/m .    General  - normal appearance, in no obvious distress  HEENT  - conjunctivae not injected, moist mucous membranes, normocephalic/atraumatic head, ears normal appearance, no lesions, mouth normal appearance, no scars, normal dentition and teeth present  CV  - normal peripheral perfusion  Pulm  - normal respiratory pattern, non-labored  Musculoskeletal - lumbar spine  - stance: normal gait without limp, no obvious leg length discrepancy, normal heel and toe walk  - inspection: normal bone and joint alignment, no obvious scoliosis  - palpation: no paravertebral or bony tenderness, minimal over L1 paraspinal muscles  - ROM: flexion slighlty exacerbates pain, normal extension, sidebending, rotation  - strength: lower extremities 5/5 in all planes  -  Neuro  - patellar and Achilles DTRs 2+ bilaterally, NO lower extremity sensory deficit throughout L5 distribution, grossly normal coordination, normal muscle tone  Skin  - no ecchymosis, erythema, warmth, or induration, no obvious rash  Psych  - interactive, appropriate, normal mood and affect  ASSESSMENT & PLAN  25 yo male with L1 compression fracture, healed, stable    I independently reviewed the following imaging  studies:  xrays lumbar spine: shows stable, healed compression L1 fracture  Cont. HEP  Start PT  RX given for voltaren  F/u in 6 weeks  Given work note to start advancing hours for work  Cont. Lifting restrictions    Appropriate PPE was utilized for prevention of spread of Covid-19.  Rebel Meredith MD, CAQSM

## 2022-02-23 NOTE — LETTER
February 23, 2022      Charbel Heard  [address] [facility] [address]        To whom it may concern:     Charbel Heard is under my professional care for a medical problem.  The patient can return to work as of 2/24/22 for up to 6 hours per day until 3/7/22 when he can advance his work days to 8 hour days until I re-evaluate him on 3/16//22.     When the patient returns to work, the following physical restrictions apply until 3/16/22:    A) Bend and squat: Occasionally with use of lumbar support brace.     B) Walk/Stand: Occasionally as needed throughout his work shift.     E) Lift, carry, push, and pull no more than:  0-10 lbs.Light duty-unable to lift more than 10 pounds.         Sincerely,        Rebel Meredith MD

## 2022-04-15 ENCOUNTER — TELEPHONE (OUTPATIENT)
Dept: GASTROENTEROLOGY | Facility: CLINIC | Age: 27
End: 2022-04-15
Payer: COMMERCIAL

## 2022-04-15 DIAGNOSIS — D12.6 ADENOMATOUS POLYP OF COLON: Primary | ICD-10-CM

## 2022-04-15 NOTE — TELEPHONE ENCOUNTER
[place] -     Please arrange for a genetic consultation for this pt.   Ind - incidentally identified advanced adenomatous colon polyp at young age.    I sent a makerist message about this today. Was unable to contact today by phone.    ARNOL Ochoa MD  Professor of Medicine  Division of Gastroenterology, Hepatology and Nutrition  [facility]

## 2022-04-18 ENCOUNTER — PATIENT OUTREACH (OUTPATIENT)
Dept: ONCOLOGY | Facility: CLINIC | Age: 27
End: 2022-04-18
Payer: COMMERCIAL

## 2022-09-10 ENCOUNTER — HEALTH MAINTENANCE LETTER (OUTPATIENT)
Age: 27
End: 2022-09-10

## 2023-04-30 ENCOUNTER — HEALTH MAINTENANCE LETTER (OUTPATIENT)
Age: 28
End: 2023-04-30

## 2023-11-14 ENCOUNTER — TELEPHONE (OUTPATIENT)
Dept: FAMILY MEDICINE | Facility: OTHER | Age: 28
End: 2023-11-14

## 2023-11-14 NOTE — TELEPHONE ENCOUNTER
Reason for Call:  Appointment Request    Patient requesting this type of appt:  Hospital/ED Follow-Up     Requested provider:  anyone    Reason patient unable to be scheduled:  appts going out to the end of November.    When does patient want to be seen/preferred time: 3-7 days    Comments: patient willing to see anyone, needs follow up    Could [facility] or would you prefer to receive a phone call?:   Patient would prefer a phone call   Okay to leave a detailed message?: No at Home number on file [phone] (home)    Call taken on 11/14/2023 at 10:00 AM by Carmelo Rod

## 2023-11-16 ENCOUNTER — TELEPHONE (OUTPATIENT)
Dept: ORTHOPEDICS | Facility: CLINIC | Age: 28
End: 2023-11-16

## 2023-11-16 NOTE — TELEPHONE ENCOUNTER
[facility] Call Center    Phone Message    May a detailed message be left on voicemail: yes     Reason for Call: Pt is having back pain wants to know if he can still be seen by Dr. Meredith, please call regarding this    Action Taken: Other: [facility]    Travel Screening: Not Applicable

## 2023-12-13 ENCOUNTER — OFFICE VISIT (OUTPATIENT)
Dept: ORTHOPEDICS | Facility: CLINIC | Age: 28
End: 2023-12-13
Payer: OTHER MISCELLANEOUS

## 2023-12-13 ENCOUNTER — ANCILLARY PROCEDURE (OUTPATIENT)
Dept: GENERAL RADIOLOGY | Facility: CLINIC | Age: 28
End: 2023-12-13
Attending: PREVENTIVE MEDICINE
Payer: OTHER MISCELLANEOUS

## 2023-12-13 DIAGNOSIS — M51.369 LUMBAR DEGENERATIVE DISC DISEASE: ICD-10-CM

## 2023-12-13 DIAGNOSIS — M62.830 LUMBAR PARASPINAL MUSCLE SPASM: ICD-10-CM
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
## 2023-12-13 DIAGNOSIS — M51.369 LUMBAR DEGENERATIVE DISC DISEASE: Primary | ICD-10-CM

## 2023-12-13 PROCEDURE — 99214 OFFICE O/P EST MOD 30 MIN: CPT | Performed by: PREVENTIVE MEDICINE

## 2023-12-13 PROCEDURE — 72100 X-RAY EXAM L-S SPINE 2/3 VWS: CPT | Performed by: STUDENT IN AN ORGANIZED HEALTH CARE EDUCATION/TRAINING PROGRAM

## 2023-12-13 RX ORDER — ETODOLAC 500 MG
500 TABLET ORAL 2 TIMES DAILY
Qty: 60 TABLET | Refills: 0 | Status: SHIPPED | OUTPATIENT
Start: 2023-12-13 | End: 2024-02-16

## 2023-12-13 NOTE — PROGRESS NOTES
HISTORY OF PRESENT ILLNESS  Mr. Heard is a pleasant 28 year old year old male who presents to clinic today with the following:  What problem are you here for? 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  Started having more radicular symptoms into right leg on/off with numbness  How long have you had this problem? About a month, otherwise has had pain from compression fracture from 2021    Have you had any recent imaging of this problem? Xrays/MRI/CT scans? Yes     Have you had treatments for this problem in the past?  -Medications? ibuprofen  -Physical therapy? Yes   -Injections? no  -Surgery? no    How severe is this problem today? 0-10 scale? 4-5    How severe has this problem been at WORST in the past? 0-10 scale? 10    What do you think caused this problem? Na     Does this problem or its symptoms cause difficulty for you falling asleep or staying asleep? Yes     Anything else you want us to know about this problem? no          MEDICAL HISTORY  Patient Active Problem List   Diagnosis     Tobacco use disorder       Current Outpatient Medications   Medication Sig Dispense Refill     diclofenac (VOLTAREN) 75 MG EC tablet Take 1 tablet (75 mg) by mouth 2 times daily as needed 40 tablet 1       Allergies   Allergen Reactions     Amoxicillin-Pot Clavulanate Anaphylaxis     Suprax [Cefixime] Anaphylaxis       Family History   Problem Relation Age of Onset     Anesthesia Reaction No family hx of      Deep Vein Thrombosis (DVT) No family hx of      Social History     Socioeconomic History     Marital status: Single   Tobacco Use     Smoking status: Every Day     Packs/day: 1     Types: Cigarettes     Smokeless tobacco: Never   Vaping Use     Vaping Use: Never used   Substance and Sexual Activity      Alcohol use: Yes     Comment: Once/year     Drug use: No     Sexual activity: Yes     Partners: Female     Birth control/protection: Condom       Additional medical/Social/Surgical histories reviewed in [facility] and updated as appropriate.     REVIEW OF SYSTEMS (12/13/2023)  10 point ROS of systems including Constitutional, Eyes, Respiratory, Cardiovascular, Gastroenterology, Genitourinary, Integumentary, Musculoskeletal, Psychiatric, Allergic/Immunologic were all negative except for pertinent positives noted in my HPI.     PHYSICAL EXAM  VSS  Vital Signs: There were no vitals taken for this visit. Patient declined being weighed. There is no height or weight on file to calculate BMI.    General  - normal appearance, in no obvious distress  HEENT  - conjunctivae not injected, moist mucous membranes, normocephalic/atraumatic head, ears normal appearance, no lesions, mouth normal appearance, no scars, normal dentition and teeth present  CV  - normal peripheral perfusion  Pulm  - normal respiratory pattern, non-labored  Musculoskeletal - lumbar spine  - stance: normal gait without limp, no obvious leg length discrepancy, normal heel and toe walk  - inspection: normal bone and joint alignment, no obvious scoliosis  - palpation: no paravertebral or bony tenderness  - ROM: flexion exacerbates pain, normal extension, sidebending, rotation  - strength: lower extremities 5/5 in all planes  - special tests:  (+) straight leg raise  (+) slump test  Neuro  - patellar and Achilles DTRs 2+ bilaterally, no lower extremity sensory deficit throughout L5 distribution, grossly normal coordination, normal muscle tone  Skin  - no ecchymosis, erythema, warmth, or induration, no obvious rash  Psych  - interactive, appropriate, normal mood and affect      ASSESSMENT & PLAN  29 yo male with lumbar discogenic pain, work related injury, radicular pain    I independently reviewed the following imaging studies:  Lumbar xray: shows some  ddd  Discussed and will consider lumbar MRI  Start phyiscal therapy  Rx given for lodine and tizanadine    Patient has been doing home exercise physical therapy program for this problem      Appropriate PPE was utilized for prevention of spread of Covid-19.  Rebel Meredith MD, CAQSM    '

## 2023-12-13 NOTE — LETTER
December 13, 2023      Charbel Heard  [address] [facility] [address]        To Whom It May Concern:    Charbel Heard was seen in our clinic. He may not return to work until I reevaluate him on 12/18/23. I will update his work status after that appointment.      Sincerely,      Rebel Meredith

## 2023-12-13 NOTE — LETTER
12/13/2023         RE: Charbel Heard  [address] [facility] [address]        Dear Colleague,    Thank you for referring your patient, Charbel Heard, to the [facility] [place]. Please see a copy of my visit note below.    HISTORY OF PRESENT ILLNESS  Mr. Heard is a pleasant 28 year old year old male who presents to clinic today with the following:  What problem are you here for? 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  Started having more radicular symptoms into right leg on/off with numbness  How long have you had this problem? About a month, otherwise has had pain from compression fracture from 2021    Have you had any recent imaging of this problem? Xrays/MRI/CT scans? Yes     Have you had treatments for this problem in the past?  -Medications? ibuprofen  -Physical therapy? Yes   -Injections? no  -Surgery? no    How severe is this problem today? 0-10 scale? 4-5    How severe has this problem been at WORST in the past? 0-10 scale? 10    What do you think caused this problem? Na     Does this problem or its symptoms cause difficulty for you falling asleep or staying asleep? Yes     Anything else you want us to know about this problem? no          MEDICAL HISTORY  Patient Active Problem List   Diagnosis     Tobacco use disorder       Current Outpatient Medications   Medication Sig Dispense Refill     diclofenac (VOLTAREN) 75 MG EC tablet Take 1 tablet (75 mg) by mouth 2 times daily as needed 40 tablet 1       Allergies   Allergen Reactions     Amoxicillin-Pot Clavulanate Anaphylaxis     Suprax [Cefixime] Anaphylaxis       Family History   Problem Relation Age of Onset     Anesthesia Reaction No family hx of      Deep Vein Thrombosis (DVT) No  family hx of      Social History     Socioeconomic History     Marital status: Single   Tobacco Use     Smoking status: Every Day     Packs/day: 1     Types: Cigarettes     Smokeless tobacco: Never   Vaping Use     Vaping Use: Never used   Substance and Sexual Activity     Alcohol use: Yes     Comment: Once/year     Drug use: No     Sexual activity: Yes     Partners: Female     Birth control/protection: Condom       Additional medical/Social/Surgical histories reviewed in [facility] and updated as appropriate.     REVIEW OF SYSTEMS (12/13/2023)  10 point ROS of systems including Constitutional, Eyes, Respiratory, Cardiovascular, Gastroenterology, Genitourinary, Integumentary, Musculoskeletal, Psychiatric, Allergic/Immunologic were all negative except for pertinent positives noted in my HPI.     PHYSICAL EXAM  VSS  Vital Signs: There were no vitals taken for this visit. Patient declined being weighed. There is no height or weight on file to calculate BMI.    General  - normal appearance, in no obvious distress  HEENT  - conjunctivae not injected, moist mucous membranes, normocephalic/atraumatic head, ears normal appearance, no lesions, mouth normal appearance, no scars, normal dentition and teeth present  CV  - normal peripheral perfusion  Pulm  - normal respiratory pattern, non-labored  Musculoskeletal - lumbar spine  - stance: normal gait without limp, no obvious leg length discrepancy, normal heel and toe walk  - inspection: normal bone and joint alignment, no obvious scoliosis  - palpation: no paravertebral or bony tenderness  - ROM: flexion exacerbates pain, normal extension, sidebending, rotation  - strength: lower extremities 5/5 in all planes  - special tests:  (+) straight leg raise  (+) slump test  Neuro  - patellar and Achilles DTRs 2+ bilaterally, no lower extremity sensory deficit throughout L5 distribution, grossly normal coordination, normal muscle tone  Skin  - no ecchymosis, erythema, warmth, or  induration, no obvious rash  Psych  - interactive, appropriate, normal mood and affect      ASSESSMENT & PLAN  29 yo male with lumbar discogenic pain, work related injury, radicular pain    I independently reviewed the following imaging studies:  Lumbar xray: shows some ddd  Discussed and will consider lumbar MRI  Start phyiscal therapy  Rx given for lodine and tizanadine    Patient has been doing home exercise physical therapy program for this problem      Appropriate PPE was utilized for prevention of spread of Covid-19.  Rebel Meredith MD, [facility]    '      Again, thank you for allowing me to participate in the care of your patient.        Sincerely,        Rebel Meredith MD

## 2023-12-15 ENCOUNTER — THERAPY VISIT (OUTPATIENT)
Dept: PHYSICAL THERAPY | Facility: CLINIC | Age: 28
End: 2023-12-15
Payer: OTHER MISCELLANEOUS

## 2023-12-15 DIAGNOSIS — M51.369 LUMBAR DEGENERATIVE DISC DISEASE: ICD-10-CM
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
## 2023-12-15 DIAGNOSIS — M62.830 LUMBAR PARASPINAL MUSCLE SPASM: ICD-10-CM

## 2023-12-15 PROCEDURE — 97110 THERAPEUTIC EXERCISES: CPT | Mod: GP

## 2023-12-15 PROCEDURE — 97161 PT EVAL LOW COMPLEX 20 MIN: CPT | Mod: GP

## 2023-12-15 NOTE — PROGRESS NOTES
PHYSICAL THERAPY EVALUATION  Type of Visit: Evaluation    See electronic medical record for Abuse and Falls Screening details.    Subjective       Presenting condition or subjective complaint: Lower back  Date of onset: 11/10/23 (DOI)    Relevant medical history:     Dates & types of surgery:      Prior diagnostic imaging/testing results: MRI; CT scan; X-ray     Prior therapy history for the same diagnosis, illness or injury: Yes Last Wednesday    Pt is a 28 year old male presenting with complaints of low back pain. The pain used toradiates into r leg and heel, but that has subsided in the last week. In the morning/early afternoon things seem ok but by the end of the day the back tends to lock up and increase pain.     The symptoms started on 11/10/23 after lifting an samantha shaft at work and are staying about the same.     Pt describes the pain as sharp and rates it a 4/10.     Aggravating Factors: sitting for over an hour, bending over    Alleviating Factors: lying on heating pad for short periods of time     Prior treatments: Had LB fx L1-L2 in Feb of 2022    Sleep Quality: some nights he can sleep through the night and other nights pain wakes him up every 1-2 hours.     Red Flags: none    Pt goals:get rid of the pain, sleep better, get back to work       Living Environment  Social support: With a significant other or spouse   Type of home: [facility]; 2-story   Stairs to enter the home: No       Ramp: No   Stairs inside the home: Yes 16     Help at home: Self Cares (home health aide/personal care attendant, family, etc)  Equipment owned:       Employment:     Hobbies/Interests: tinkering/mechanics    Patient goals for therapy: Return to work    Pain assessment: See Above     Objective   LUMBAR:    Posture: Slight forward head and shoulder posture    Gait: WNL    Functional Screen:   -Sit to stand: Able to perform without use of hands - no increase in pain  -Squat: Able to perform with good mechanics - little  stiff throughout the back   -SLS: 20    ROM (* Denotes Pain):  -Flexion: Hands to mid-shin  -Extension: WNL  -L SB: Hand to knee  -R SB: hand to knee    Repeated Motion Exam:   -Prone lying: Symptoms slightly decreased from start  -Prone on elbows: symptoms slightly decreased from start; no different than in prone lying   -Repeated flexion standing: Increase in back pain; no radiation down leg    Neuro Screen:   Myotomes/Strength (* Denotes Pain):   Myotomes L R   L1-2 (hip flexion) 5/5 4+/5   L3 (knee extension) 5/5 4+/5*   L4 (ankle DF) 5/5 5/5   L5 (g. toe ext) 5/5 5/5   S1 (ankle PF or knee flex) 5/5 4+/5*     Dermatomes: L2-S1 dermatomes intact to touch bilaterally     Special Tests:   L R   Slump     SLR - (only hamstring stretch - stretch at ~45 degrees; no increase with neck flexion) - (only hamstring stretch - stretch at ~45 degrees; no increase with neck flexion)   Long Axis Traction      FADIR     FABIR     Scour - Increased pain in back   Hamstring 90/90     Piriformis Test     Posterior Capsule Compression         LE Relevant Findings:   -Strength: See above  -ROM: Decreased hamstring length; Hip IR, ER and flexion (knee bent) all WNL; slight pain in back with R hip IR/ER    Palpation: TTP at L3-L5 vertebrae - decreases above and below segments; no pain in surrounding musculature; no TTP to sacral palpation    SIJ Screen   -Thigh Thrust: L: -; R: +  -Sacral Thrust: -      Assessment & Plan   CLINICAL IMPRESSIONS  Medical Diagnosis: lumbar degenerative disc disease    Treatment Diagnosis: Low back pain   Impression/Assessment: Patient is a 28 year old male with low back pain complaints.  The following significant findings have been identified: Pain, Decreased ROM/flexibility, Decreased joint mobility, Decreased strength, Impaired balance, Impaired gait, Impaired muscle performance, and Decreased activity tolerance. These impairments interfere with their ability to perform self care tasks, work tasks,  recreational activities, household chores, driving , household mobility, and community mobility as compared to previous level of function.     Clinical Decision Making (Complexity):  Clinical Presentation: Stable/Uncomplicated  Clinical Presentation Rationale: based on medical and personal factors listed in PT evaluation  Clinical Decision Making (Complexity): Low complexity    PLAN OF CARE  Treatment Interventions:  Modalities: Cryotherapy, E-stim, Hot Pack, Ultrasound  Interventions: Gait Training, Manual Therapy, Neuromuscular Re-education, Therapeutic Activity, Therapeutic Exercise, Self-Care/Home Management    Long Term Goals     PT Goal 1  Goal Identifier: Sleep  Goal Description: Pt will sleep through the night for 5 consecutive nights without being awoken by pain  Rationale: to maximize safety and independence with performance of ADLs and functional tasks;to maximize safety and independence with self cares  Goal Progress: Intermittently woken by pain  Target Date: 02/09/24  PT Goal 2  Goal Identifier: Sitting  Goal Description: Pt will be able to tolerate sitting for over an hour with a pain rating of no more than a 1/10  Rationale: to maximize safety and independence with performance of ADLs and functional tasks;to maximize safety and independence within the community;to maximize safety and independence within the home  Goal Progress: Unable to tolerate for more than 1 hour without a 4/10 pain  Target Date: 02/09/24  PT Goal 3  Goal Identifier: Pain  Goal Description: Pt will go through 3 consecutive days with a pain rating of no more than 1/10 pain  Rationale: to maximize safety and independence with performance of ADLs and functional tasks;to maximize safety and independence within the community;to maximize safety and independence with self cares;to maximize safety and independence within the home  Goal Progress: Has at least a 4/10 pain on a daily basis  Target Date: 02/09/24      Frequency of Treatment:  1x/week  Duration of Treatment: 8 weeks    Recommended Referrals to Other Professionals:  none  Education Assessment:   Learner/Method: Patient    Risks and benefits of evaluation/treatment have been explained.   Patient/Family/caregiver agrees with Plan of Care.     Evaluation Time:     PT Eval, Low Complexity Minutes (94050): (P) 15     Signing Clinician: Nilam Valenzuela PT

## 2023-12-18 ENCOUNTER — VIRTUAL VISIT (OUTPATIENT)
Dept: ORTHOPEDICS | Facility: CLINIC | Age: 28
End: 2023-12-18
Payer: OTHER MISCELLANEOUS

## 2023-12-18 ENCOUNTER — TELEPHONE (OUTPATIENT)
Dept: ORTHOPEDICS | Facility: CLINIC | Age: 28
End: 2023-12-18

## 2023-12-18 DIAGNOSIS — M51.369 LUMBAR DEGENERATIVE DISC DISEASE: Primary | ICD-10-CM

## 2023-12-18 DIAGNOSIS — M62.830 LUMBAR PARASPINAL MUSCLE SPASM: ICD-10-CM

## 2023-12-18 DIAGNOSIS — M51.16 LUMBAR DISC HERNIATION WITH RADICULOPATHY: ICD-10-CM
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
## 2023-12-18 PROCEDURE — 99214 OFFICE O/P EST MOD 30 MIN: CPT | Mod: 95 | Performed by: PREVENTIVE MEDICINE

## 2023-12-18 RX ORDER — PREDNISONE 20 MG/1
40 TABLET ORAL DAILY
Qty: 14 TABLET | Refills: 0 | Status: SHIPPED | OUTPATIENT
Start: 2023-12-18 | End: 2024-02-16

## 2023-12-18 NOTE — LETTER
12/18/2023         RE: Charbel Heard  [address] [address] [facility] [address]        Dear Colleague,    Thank you for referring your patient, Charbel Heard, to the [facility] [place]. Please see a copy of my visit note below.    Patient is a  28   year old who is being evaluated via a billable telephone visit.      What phone number would you like to be contacted at? CELL  How would you like to obtain your AVS? MYCHART        Subjective   Patient is a  28   year old who presents by phone call visit for the following:     HPI   Followup for low back pain with radicular pain  After work related injury see previous notes      Review of Systems   Constitutional, HEENT, cardiovascular, pulmonary, gi and gu systems are negative, except as otherwise noted.      Objective           Vitals:  No vitals were obtained today due to virtual visit.    Physical Exam   healthy, alert, and no distress  PSYCH: Alert and oriented times 3; coherent speech, normal   rate and volume, able to articulate logical thoughts, able   to abstract reason, no tangential thoughts, no hallucinations   or delusions  His affect is normal  RESP: No cough, no audible wheezing, able to talk in full sentences  Remainder of exam unable to be completed due to telephone visits    Assessment/Plan  29 yo male with lumbar ddd, disc herniations, radicular pain, wok related injury, not resolved    I independently reviewed the following imaging studies and discussed with patient:  Lumbar xrays: shows some ddd  Discussed and ordered lumbar MRI due to persistent pain and radicular symptoms  Given note for work for sedentary work only at this time  Followup after MRI  Given RX for prednisone  Cont. Other medications          Phone call duration:20 minutes  Phone call start: 850am  Phone call end: 910am  Dr Meredith      Again, thank you for allowing me to participate in the care of your patient.        Sincerely,        Rebel  Yonathan Meredith MD

## 2023-12-18 NOTE — LETTER
December 18, 2023      Charbel Heard  [address] [address] [facility] [address]        To Whom It May Concern:  Charbel Heard was seen in our clinic. He may return to work with the following: He must work sedentary at a desk setting from today until I reevaluate him on 12/29/23. Please contact my office with any questions or concerns       Sincerely,      Rebel Meredith

## 2023-12-18 NOTE — PROGRESS NOTES
Patient is a  28   year old who is being evaluated via a billable telephone visit.      What phone number would you like to be contacted at? CELL  How would you like to obtain your AVS? MACK        Subjective   Patient is a  28   year old who presents by phone call visit for the following:     HPI   Followup for low back pain with radicular pain  After work related injury see previous notes      Review of Systems   Constitutional, HEENT, cardiovascular, pulmonary, gi and gu systems are negative, except as otherwise noted.      Objective           Vitals:  No vitals were obtained today due to virtual visit.    Physical Exam   healthy, alert, and no distress  PSYCH: Alert and oriented times 3; coherent speech, normal   rate and volume, able to articulate logical thoughts, able   to abstract reason, no tangential thoughts, no hallucinations   or delusions  His affect is normal  RESP: No cough, no audible wheezing, able to talk in full sentences  Remainder of exam unable to be completed due to telephone visits    Assessment/Plan  29 yo male with lumbar ddd, disc herniations, radicular pain, wok related injury, not resolved    I independently reviewed the following imaging studies and discussed with patient:  Lumbar xrays: shows some ddd  Discussed and ordered lumbar MRI due to persistent pain and radicular symptoms  Given note for work for sedentary work only at this time  Followup after MRI  Given RX for prednisone  Cont. Other medications          Phone call duration:20 minutes  Phone call start: 850am  Phone call end: 910am  Dr Meredith

## 2023-12-18 NOTE — LETTER
12/18/2023         RE: Charbel Heard  [address] [address] [address]        Dear Colleague,    Thank you for referring your patient, Charbel Heard, to the [facility] [place]. Please see a copy of my visit note below.    Patient is a  28   year old who is being evaluated via a billable telephone visit.      What phone number would you like to be contacted at? CELL  How would you like to obtain your AVS? MYCHART        Subjective   Patient is a  28   year old who presents by phone call visit for the following:     HPI   Followup for low back pain with radicular pain  After work related injury see previous notes      Review of Systems   Constitutional, HEENT, cardiovascular, pulmonary, gi and gu systems are negative, except as otherwise noted.      Objective           Vitals:  No vitals were obtained today due to virtual visit.    Physical Exam   healthy, alert, and no distress  PSYCH: Alert and oriented times 3; coherent speech, normal   rate and volume, able to articulate logical thoughts, able   to abstract reason, no tangential thoughts, no hallucinations   or delusions  His affect is normal  RESP: No cough, no audible wheezing, able to talk in full sentences  Remainder of exam unable to be completed due to telephone visits    Assessment/Plan  27 yo male with lumbar ddd, disc herniations, radicular pain, wok related injury, not resolved    I independently reviewed the following imaging studies and discussed with patient:  Lumbar xrays: shows some ddd  Discussed and ordered lumbar MRI due to persistent pain and radicular symptoms  Given note for work for sedentary work only at this time  Followup after MRI  Given RX for prednisone  Cont. Other medications          Phone call duration:20 minutes  Phone call start: 850am  Phone call end: 910am  Dr Meredith      Again, thank you for allowing me to participate in the care of your patient.        Sincerely,        Rebel  Yonathan Meredith MD

## 2023-12-18 NOTE — LETTER
December 18, 2023      Charbel Heard  [address] [address] [address]        To Whom It May Concern:    Charbel Heard was seen in our clinic. He may return to work with the following: He must work sedentary at a desk setting from today until I reevaluate him on 12/19/23. Please contact my office with any questions or concerns      Sincerely,      Rebel Meredith MD

## 2023-12-21 NOTE — TELEPHONE ENCOUNTER
Medication: predniSONE (DELTASONE) 20 MG tablet   Pharmacy billed claim to work comp insurance which our pa team/ clinic does not handle.  Called and informed pharmacy staff.    Pharmacy Notified:  Yes- pharmacy needs handle work comp claim/ pa.  Patient Notified:  No    Please close encounter when finished.    Thank you,  Central Prior Authorization Team  [phone]

## 2023-12-27 ENCOUNTER — THERAPY VISIT (OUTPATIENT)
Dept: PHYSICAL THERAPY | Facility: CLINIC | Age: 28
End: 2023-12-27
Payer: OTHER MISCELLANEOUS

## 2023-12-27 DIAGNOSIS — M51.369 LUMBAR DEGENERATIVE DISC DISEASE: Primary | ICD-10-CM
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
## 2023-12-27 DIAGNOSIS — M62.830 LUMBAR PARASPINAL MUSCLE SPASM: ICD-10-CM

## 2023-12-27 PROCEDURE — 97112 NEUROMUSCULAR REEDUCATION: CPT | Mod: GP

## 2023-12-27 PROCEDURE — 97110 THERAPEUTIC EXERCISES: CPT | Mod: GP

## 2024-01-03 ENCOUNTER — TELEPHONE (OUTPATIENT)
Dept: ORTHOPEDICS | Facility: CLINIC | Age: 29
End: 2024-01-03

## 2024-01-03 NOTE — TELEPHONE ENCOUNTER
FYI - Status Update    Who is Calling: nurse, [employer] calling needing MRI order to be faxed over  Fax : [phone]    Update: te sent to clinic     Does caller want a call/response back: No

## 2024-01-08 ENCOUNTER — TELEPHONE (OUTPATIENT)
Dept: ORTHOPEDICS | Facility: CLINIC | Age: 29
End: 2024-01-08

## 2024-01-08 NOTE — TELEPHONE ENCOUNTER
Please FAX pt's MRI order to:    Xiomara Orozco:    [phone]    Previous fax was sent through, but it was the Visit Summary, not the MRI order. Thank you.

## 2024-01-08 NOTE — TELEPHONE ENCOUNTER
[facility] Call Center    Phone Message    May a detailed message be left on voicemail: yes     Reason for Call: Other: Xiomara is calling from [employer]. She is needing MRI order faxed over. Fax number is [phone]     Action Taken: Other: Te     Travel Screening: Not Applicable

## 2024-01-10 ENCOUNTER — ANCILLARY PROCEDURE (OUTPATIENT)
Dept: MRI IMAGING | Facility: CLINIC | Age: 29
End: 2024-01-10
Attending: PREVENTIVE MEDICINE
Payer: OTHER MISCELLANEOUS
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
## 2024-01-10 DIAGNOSIS — M51.16 LUMBAR DISC HERNIATION WITH RADICULOPATHY: ICD-10-CM

## 2024-01-10 DIAGNOSIS — M51.369 LUMBAR DEGENERATIVE DISC DISEASE: ICD-10-CM

## 2024-01-10 DIAGNOSIS — M62.830 LUMBAR PARASPINAL MUSCLE SPASM: ICD-10-CM

## 2024-01-10 PROCEDURE — 72148 MRI LUMBAR SPINE W/O DYE: CPT | Performed by: RADIOLOGY

## 2024-01-15 ENCOUNTER — TELEPHONE (OUTPATIENT)
Dept: ORTHOPEDICS | Facility: CLINIC | Age: 29
End: 2024-01-15

## 2024-01-15 ENCOUNTER — VIRTUAL VISIT (OUTPATIENT)
Dept: ORTHOPEDICS | Facility: CLINIC | Age: 29
End: 2024-01-15
Payer: OTHER MISCELLANEOUS

## 2024-01-15 DIAGNOSIS — M62.830 LUMBAR PARASPINAL MUSCLE SPASM: ICD-10-CM

## 2024-01-15 DIAGNOSIS — S32.020A COMPRESSION FRACTURE OF L2 VERTEBRA, INITIAL ENCOUNTER (H): ICD-10-CM
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
## 2024-01-15 PROCEDURE — 99213 OFFICE O/P EST LOW 20 MIN: CPT | Mod: 93 | Performed by: PREVENTIVE MEDICINE

## 2024-01-15 NOTE — LETTER
January 15, 2024      Charbel Heard  [address] [address] [address]        To Whom It May Concern:    Charbel Heard was seen in our clinic. He may return to work with the following: He may work doing only sedentary work until I reevaluate him on 2/10/24    Sincerely,      Rebel Meredith MD

## 2024-01-15 NOTE — TELEPHONE ENCOUNTER
EUNICEM to schedule appt with Dr. Elias from referral received from Dr. Meredith for chronic L2 compression deformity from a previous fracture. Contact info provided for patient to schedule at [facility] [place]

## 2024-01-15 NOTE — TELEPHONE ENCOUNTER
[facility] Call Center    Phone Message    May a detailed message be left on voicemail: yes     Reason for Call: Other: Patient is calling in because he was in the emergency room on 1/14/24 for severe back pain. Patient would like to follow up with Dr Meredith, but the first available is 2/10/24. Please call patient if he can be added into Dr Meredith's schedule. Patient is on waitlist.      Action Taken: Other: [id number]    Travel Screening: Not Applicable

## 2024-01-15 NOTE — LETTER
1/15/2024         RE: Charbel Heard  [address] [address] [address]        Dear Colleague,    Thank you for referring your patient, Charbel Heard, to the [facility] [place]. Please see a copy of my visit note below.    Patient is a   28  year old who is being evaluated via a billable telephone visit.      What phone number would you like to be contacted at? CELL  How would you like to obtain your AVS? MYCHART        Subjective   Patient is a 28    year old who presents by phone call visit for the following:     HPI   Followup for lumbar L2 chronic compression fracture and recent work related back injury  Not resolved yet  Had MRI last week and would like to review  Had to visit emergency room over the weekend due to increased pain mid/lower back    Review of Systems   Constitutional, HEENT, cardiovascular, pulmonary, gi and gu systems are negative, except as otherwise noted.      Objective           Vitals:  No vitals were obtained today due to virtual visit.    Physical Exam   healthy, alert, and no distress  PSYCH: Alert and oriented times 3; coherent speech, normal   rate and volume, able to articulate logical thoughts, able   to abstract reason, no tangential thoughts, no hallucinations   or delusions  His affect is normal  RESP: No cough, no audible wheezing, able to talk in full sentences  Remainder of exam unable to be completed due to telephone visits    Assessment/Plan  29 yo male with work related back strain/injury and aggravation of chronic L2 compression fracture, not resolved    I independently reviewed the following imaging studies and discussed with patient:  Lumbar MRI: shows   1. Chronic superior compression deformity of L2 with interval  resolution of edema. No progressive height loss.  2. No spinal canal or neural foraminal stenosis.  Given work note for sedentary work only for now  Referred to Dr Elias spine surgery for further discussion  regarding treatment of L2 chronic compression fracture and recurrent pain symptoms        Phone call duration: 20 minutes  Phone call start: 1200pm  Phone call end: 1220pm  Dr Meredith      Again, thank you for allowing me to participate in the care of your patient.        Sincerely,        Rebel Meredith MD

## 2024-01-15 NOTE — PROGRESS NOTES
Patient is a   28  year old who is being evaluated via a billable telephone visit.      What phone number would you like to be contacted at? CELL  How would you like to obtain your AVS? MACK        Subjective   Patient is a 28    year old who presents by phone call visit for the following:     HPI   Followup for lumbar L2 chronic compression fracture and recent work related back injury  Not resolved yet  Had MRI last week and would like to review  Had to visit emergency room over the weekend due to increased pain mid/lower back    Review of Systems   Constitutional, HEENT, cardiovascular, pulmonary, gi and gu systems are negative, except as otherwise noted.      Objective           Vitals:  No vitals were obtained today due to virtual visit.    Physical Exam   healthy, alert, and no distress  PSYCH: Alert and oriented times 3; coherent speech, normal   rate and volume, able to articulate logical thoughts, able   to abstract reason, no tangential thoughts, no hallucinations   or delusions  His affect is normal  RESP: No cough, no audible wheezing, able to talk in full sentences  Remainder of exam unable to be completed due to telephone visits    Assessment/Plan  29 yo male with work related back strain/injury and aggravation of chronic L2 compression fracture, not resolved    I independently reviewed the following imaging studies and discussed with patient:  Lumbar MRI: shows   1. Chronic superior compression deformity of L2 with interval  resolution of edema. No progressive height loss.  2. No spinal canal or neural foraminal stenosis.  Given work note for sedentary work only for now  Referred to Dr Elias spine surgery for further discussion regarding treatment of L2 chronic compression fracture and recurrent pain symptoms        Phone call duration: 20 minutes  Phone call start: 1200pm  Phone call end: 1220pm  Dr Meredith

## 2024-01-15 NOTE — LETTER
1/15/2024         RE: Charbel Heard  [address] [address] [address]        Dear Colleague,    Thank you for referring your patient, Charbel Heard, to the [facility] [place]. Please see a copy of my visit note below.    Patient is a   28  year old who is being evaluated via a billable telephone visit.      What phone number would you like to be contacted at? CELL  How would you like to obtain your AVS? MYCHART        Subjective   Patient is a 28    year old who presents by phone call visit for the following:     HPI   Followup for lumbar L2 chronic compression fracture and recent work related back injury  Not resolved yet  Had MRI last week and would like to review  Had to visit emergency room over the weekend due to increased pain mid/lower back    Review of Systems   Constitutional, HEENT, cardiovascular, pulmonary, gi and gu systems are negative, except as otherwise noted.      Objective           Vitals:  No vitals were obtained today due to virtual visit.    Physical Exam   healthy, alert, and no distress  PSYCH: Alert and oriented times 3; coherent speech, normal   rate and volume, able to articulate logical thoughts, able   to abstract reason, no tangential thoughts, no hallucinations   or delusions  His affect is normal  RESP: No cough, no audible wheezing, able to talk in full sentences  Remainder of exam unable to be completed due to telephone visits    Assessment/Plan  29 yo male with work related back strain/injury and aggravation of chronic L2 compression fracture, not resolved    I independently reviewed the following imaging studies and discussed with patient:  Lumbar MRI: shows   1. Chronic superior compression deformity of L2 with interval  resolution of edema. No progressive height loss.  2. No spinal canal or neural foraminal stenosis.  Given work note for sedentary work only for now  Referred to Dr Elias spine surgery for further discussion  regarding treatment of L2 chronic compression fracture and recurrent pain symptoms        Phone call duration: 20 minutes  Phone call start: 1200pm  Phone call end: 1220pm  Dr Meredith      Again, thank you for allowing me to participate in the care of your patient.        Sincerely,        Rebel Meredith MD

## 2024-01-22 ENCOUNTER — TELEPHONE (OUTPATIENT)
Dept: ORTHOPEDICS | Facility: CLINIC | Age: 29
End: 2024-01-22

## 2024-01-23 DIAGNOSIS — M51.369 LUMBAR DEGENERATIVE DISC DISEASE: Primary | ICD-10-CM
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
## 2024-01-23 DIAGNOSIS — M62.830 LUMBAR PARASPINAL MUSCLE SPASM: ICD-10-CM

## 2024-01-23 NOTE — TELEPHONE ENCOUNTER
Prescription refill requested for:   Etodolac (LODINE) 500 MG tablet       Last Written Prescription Date:  12/13/23  Last Fill Quantity: 60,   # refills: 0  Last Office Visit: 1/15/24  Future Office visit:    Next 5 appointments (look out 90 days)      Feb 10, 2024 10:20 AM  (Arrive by 10:05 AM)  Return Visit with Rebel Meredith MD  [facility] [place] ([facility] [place]) [address] [address] [place] [id number] [phone]                 Brandon Noland, EMT

## 2024-01-29 RX ORDER — ETODOLAC 500 MG
500 TABLET ORAL 2 TIMES DAILY
Qty: 60 TABLET | Refills: 0 | Status: SHIPPED | OUTPATIENT
Start: 2024-01-29 | End: 2024-02-16

## 2024-02-07 PROBLEM — M62.830 LUMBAR PARASPINAL MUSCLE SPASM: Status: RESOLVED | Noted: 2023-12-15 | Resolved: 2024-02-07
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
## 2024-02-07 PROBLEM — M51.369 LUMBAR DEGENERATIVE DISC DISEASE: Status: RESOLVED | Noted: 2023-12-15 | Resolved: 2024-02-07

## 2024-02-07 NOTE — PROGRESS NOTES
12/27/23 0500   Appointment Info   Signing clinician's name / credentials Nilam Valenzuela, PT, DPT   Total/Authorized Visits 8 planned   Visits Used 2   Medical Diagnosis lumbar degenerative disc disease   PT Tx Diagnosis Low back pain   Progress Note/Certification   Onset of illness/injury or Date of Surgery 11/10/23  (DOI)   Therapy Frequency 1x/week   Predicted Duration 8 weeks   Progress Note Due Date 02/09/24   Progress Note Completed Date 12/15/23   GOALS   PT Goals 2;3   PT Goal 1   Goal Identifier Sleep   Goal Description Pt will sleep through the night for 5 consecutive nights without being awoken by pain   Rationale to maximize safety and independence with performance of ADLs and functional tasks;to maximize safety and independence with self cares   Goal Progress Sleep has gotten a little better but not a ton   Target Date 02/09/24   PT Goal 2   Goal Identifier Sitting   Goal Description Pt will be able to tolerate sitting for over an hour with a pain rating of no more than a 1/10   Rationale to maximize safety and independence with performance of ADLs and functional tasks;to maximize safety and independence within the community;to maximize safety and independence within the home   Goal Progress Unable to tolerate for more than 1 hour without a 4/10 pain   Target Date 02/09/24   PT Goal 3   Goal Identifier Pain   Goal Description Pt will go through 3 consecutive days with a pain rating of no more than 1/10 pain   Rationale to maximize safety and independence with performance of ADLs and functional tasks;to maximize safety and independence within the community;to maximize safety and independence with self cares;to maximize safety and independence within the home   Goal Progress Down to a 2/3 out of 10 in the morning; 8/9 out of 10 in late afternoon   Target Date 02/09/24   Subjective Report   Subjective Report Pt reports that things have been kind of up and down - especially with all the extra things  he did over [place]. Pt reports that the exercises are going well overall. The extension exercises seem to help for a bit while doing them but the pain typically comes back. Pt has an MRI set up for mid-unary. Sleep has gotten a litte better, though still sometimes interupted due to pain.   Objective Measures   Objective Measures Objective Measure 1;Objective Measure 2;Objective Measure 3   Objective Measure 1   Objective Measure Lumbar jt springing   Details decrased mobility in lumbar spine - increased pain   Objective Measure 2   Objective Measure Palpation   Details increased guarding in lumbar spine paraspinals   Objective Measure 3   Objective Measure MMT   Details Hip ABD: L: 4+/5; R: 4-/5   Treatment Interventions (PT)   Interventions Therapeutic Procedure/Exercise;Neuromuscular Re-education;Manual Therapy   Therapeutic Procedure/Exercise   Therapeutic Procedures: strength, endurance, ROM, flexibillity minutes (33459) 15   Ther Proc 1 Education   Ther Proc 1 - Details Disucssed with pt trying extension exercises when his back is flared up to see if it helps   PTRx Ther Proc 1 Supine Hamstring Stretch   PTRx Ther Proc 1 - Details x10 toes towards nose for nerve floss   PTRx Ther Proc 2 Seated Hamstring Stretch   PTRx Ther Proc 2 - Details 30 second hold   PTRx Ther Proc 3 Prone Positioning   PTRx Ther Proc 3 - Details 2 min - slight decrease in pain   PTRx Ther Proc 4 Prone On Elbows   PTRx Ther Proc 4 - Details 1 min - slight decrease in pain   PTRx Ther Proc 5 Standing Extension   PTRx Ther Proc 5 - Details verbal review   Skilled Intervention Progression of exercises as tolerated; glut exercises   Patient Response/Progress Pt tolerated exercises well without increase in pain   PTRx Ther Proc 6 Clamshell Feet together   PTRx Ther Proc 6 - Details x10 on R; verbal and tactile cues for correct form and muscle engagement   Ther Proc 2 Prone press up   Ther Proc 2 - Details x10; slight increase in  tightness/pain in middle of back   Therapeutic Procedures Ther Proc 2   Neuromuscular Re-education   PTRx Neuro Re-ed 1 Posterior Pelvic Tilt   PTRx Neuro Re-ed 1 - Details x10; verbal and tactile cues for correct form    PTRx Neuro Re-ed 2 Pallof Press   PTRx Neuro Re-ed 2 - Details x10 press outs 1 15 sec hold, B; verbal cues for correct form and to engage pelvic tilt ; held for now due to increase in back tension   Neuromuscular re-ed of mvmt, balance, coord, kinesthetic sense, posture, proprioception minutes (24523) 10   Skilled Intervention Addition of stab exercises to activate TrA   Patient Response/Progress Pt tolerated posterior pelvic tilt well without increase in pain; pallof press held for now   Manual Therapy   Manual Therapy: Mobilization, MFR, MLD, friction massage minutes (64987) 5   Manual Therapy Manual Therapy 2   Manual Therapy 1 STM   Manual Therapy 1 - Details STM to lumbar paraspinals   Skilled Intervention manual to promote relaxation and increase ROM in lumbar spine   Patient Response/Progress Slight increase in pain, decreased with modified pressure   Manual Therapy 2 Jt mobs   Manual Therapy 2 - Details Jt mobs to lumbar vertebrae - Grade I/II   Education   Learner/Method Patient   Plan   Home program See PTRX   Plan for next session Reassess how ext exercises are going; progress stab exercises - trial pallof press again   Comments   Comments Decreased tx due to patient irritabilty with most exercises   Total Session Time   Timed Code Treatment Minutes 30   Total Treatment Time (sum of timed and untimed services) 30         DISCHARGE  Reason for Discharge: Patient has failed to schedule further appointments.    Equipment Issued: none    Discharge Plan: Patient to continue home program.    Referring Provider:  Rebel Meredith

## 2024-02-16 ENCOUNTER — OFFICE VISIT (OUTPATIENT)
Dept: NEUROSURGERY | Facility: CLINIC | Age: 29
End: 2024-02-16
Payer: OTHER MISCELLANEOUS

## 2024-02-16 VITALS
HEART RATE: 68 BPM | BODY MASS INDEX: 29.16 KG/M2 | DIASTOLIC BLOOD PRESSURE: 67 MMHG | HEIGHT: 65 IN | WEIGHT: 175 LBS | SYSTOLIC BLOOD PRESSURE: 134 MMHG

## 2024-02-16 DIAGNOSIS — M54.50 CHRONIC MIDLINE LOW BACK PAIN WITHOUT SCIATICA: Primary | ICD-10-CM

## 2024-02-16 DIAGNOSIS — G89.29 CHRONIC MIDLINE LOW BACK PAIN WITHOUT SCIATICA: Primary | ICD-10-CM

## 2024-02-16 PROCEDURE — 99204 OFFICE O/P NEW MOD 45 MIN: CPT | Performed by: ORTHOPAEDIC SURGERY

## 2024-02-16 RX ORDER — IBUPROFEN 200 MG
800 TABLET ORAL EVERY 4 HOURS PRN
COMMUNITY

## 2024-02-16 RX ORDER — DICLOFENAC SODIUM 75 MG/1
75 TABLET, DELAYED RELEASE ORAL 2 TIMES DAILY PRN
Qty: 60 TABLET | Refills: 1 | Status: SHIPPED | OUTPATIENT
Start: 2024-02-16

## 2024-02-16 ASSESSMENT — PAIN SCALES - GENERAL: PAINLEVEL: MODERATE PAIN (4)

## 2024-02-16 NOTE — PROGRESS NOTES
Spine Surgery Consultation    REFERRING PHYSICIAN: Rebel Meredith   PRIMARY CARE PHYSICIAN: System, Provider Not In           Chief Complaint:   Consult      History of Present Illness:  Symptom Profile Including: location of symptoms, onset, severity, exacerbating/alleviating factors, previous treatments:        Charbel Heard is a 28 year old male who presents today after a work-related injury in November has been followed by Dr. Collins and he went to physical therapy and did some lower extremity range of motion but sounds like pretty limited physical therapy.  He tried some anti-inflammatories and a muscle relaxant no injections at this time he has a history of a chronic L2 fracture which healed several years ago and he was symptom-free for a while but had this exacerbation at work.  The pain is all in the mid aspect of his back denies any radicular or claudicatory leg symptoms.  He works as a  and unfortunately has been off work since the injury.         Past Medical History:   No past medical history on file.         Past Surgical History:     Past Surgical History:   Procedure Laterality Date    APPENDECTOMY      COLONOSCOPY N/A 2/22/2022    Procedure: COLONOSCOPY, WITH POLYPECTOMY AND BIOPSY;  Surgeon: Neftali Ochoa MD;  Location: [facility]    COLONOSCOPY, WITH ENDOSCOPIC ULTRASOUND GUIDANCE N/A 2/22/2022    Procedure: COLONOSCOPY, WITH ENDOSCOPIC ULTRASOUND GUIDANCE;  Surgeon: Neftali Ochoa MD;  Location: [facility]    HERNIA REPAIR              Social History:     Social History     Tobacco Use    Smoking status: Every Day     Packs/day: 1     Types: Cigarettes    Smokeless tobacco: Never   Substance Use Topics    Alcohol use: Yes     Comment: Once/year            Family History:     Family History   Problem Relation Age of Onset    Anesthesia Reaction No family hx of     Deep Vein Thrombosis (DVT) No family hx of             Allergies:     Allergies   Allergen Reactions     "Amoxicillin-Pot Clavulanate Anaphylaxis    Suprax [Cefixime] Anaphylaxis            Medications:     Current Outpatient Medications   Medication    ibuprofen (ADVIL/MOTRIN) 200 MG tablet     No current facility-administered medications for this visit.             Review of Systems:     A 10 point ROS was performed and reviewed. Specific responses to these questions are noted at the end of the document.         Physical Exam:   Vitals: /67   Pulse 68   Ht 1.651 m (5' 5\")   Wt 79.4 kg (175 lb)   BMI 29.12 kg/m    Constitutional: awake, alert, cooperative, no apparent distress, appears stated age.    Eyes: The sclera are white.  Ears, Nose, Throat: The trachea is midline.  Psychiatric: The patient has a normal affect.  Respiratory: breathing non-labored  Cardiovascular: The extremities are warm and perfused.  Skin: no obvious rashes or lesions.  Musculoskeletal, Neurologic, and Spine:          Lumbar Spine:    Appearance - No gross stepoffs or deformities    Motor -     L2-3: Hip flexion 5/5 R and 5/5 L strength          L3/4:  Knee extension R 5/5 and L 5/5 strength         L4/5:  Foot dorsiflexion R 5/5 L 5/5 and       EHL dorsiflexion R 5/5 L 5/5 strength         S1:  Plantarflexion/Peroneal Muscles  R 5/5 and L 5/5 strength    Sensation: intact to light touch L3-S1 distribution BLE      Neurologic:      REFLEXES Left Right   Biceps 1+ 1+   Triceps 1+ 1+   Brachioradialis 1+ 1+   Patella 1+ 1+   Ankle jerk 1+ 1+   Babinski No upgoing great toe No upgoing great toe   Clonus 0 beats 0 beats     Hip Exam:  No pain with hip log roll and no tenderness over the greater trochanters.    Alignment:  Patient stands with a neutral standing sagittal balance.           Imaging:   We ordered and independently reviewed new radiographs at this clinic visit. The results were discussed with the patient.  Findings include:    January 10, 2024 lumbar MRI shows old and well-healed L2 fracture no obvious acute " pathology    December 13 lumbar radiographs show old L2 fracture    January 4 lumbar MRI 2022 also shows old L2 fracture no change compared to current MRI             Assessment and Plan:   Assessment:  28 year old male with back and leg symptoms of uncertain etiology, no acute pathology on exam, chronic well-healed L2 fracture right which I do not think is a source of symptoms     Plan:  I do not see anything structurally abnormal in his spine.  I recommended that he try to advance his activities with physical therapy and do some work hardening recommended core strengthening traction physical therapy with lower extremity strengthening.  I refilled his diclofenac and his muscle relaxant.  Since has not been improving I did agree to order an L2-3 interlaminar injection since it is around the area of the fracture and also seems to be around the area that is hurting him but I do not think anything on imaging would support offering surgery at this time and I do not see anything structurally that I think should prevent long-term limitation or source of disability and I asked him to try to advance his activities with therapy.  I agreed to temporarily continue his work restrictions while he works through the therapy plan.        Respectfully,  Rey Elias MD  Spine Surgery  [facility]

## 2024-02-16 NOTE — LETTER
2/16/2024         RE: Charbel Heard  [address]  [place] [address]        Dear Colleague,    Thank you for referring your patient, Charbel Heard, to the [facility] [place]. Please see a copy of my visit note below.    Spine Surgery Consultation    REFERRING PHYSICIAN: Rebel Meredith   PRIMARY CARE PHYSICIAN: System, Provider Not In           Chief Complaint:   Consult      History of Present Illness:  Symptom Profile Including: location of symptoms, onset, severity, exacerbating/alleviating factors, previous treatments:        Charbel Heard is a 28 year old male who presents today after a work-related injury in November has been followed by Dr. Collins and he went to physical therapy and did some lower extremity range of motion but sounds like pretty limited physical therapy.  He tried some anti-inflammatories and a muscle relaxant no injections at this time he has a history of a chronic L2 fracture which healed several years ago and he was symptom-free for a while but had this exacerbation at work.  The pain is all in the mid aspect of his back denies any radicular or claudicatory leg symptoms.  He works as a  and unfortunately has been off work since the injury.         Past Medical History:   No past medical history on file.         Past Surgical History:     Past Surgical History:   Procedure Laterality Date     APPENDECTOMY       COLONOSCOPY N/A 2/22/2022    Procedure: COLONOSCOPY, WITH POLYPECTOMY AND BIOPSY;  Surgeon: Neftali Ochoa MD;  Location: [facility]     COLONOSCOPY, WITH ENDOSCOPIC ULTRASOUND GUIDANCE N/A 2/22/2022    Procedure: COLONOSCOPY, WITH ENDOSCOPIC ULTRASOUND GUIDANCE;  Surgeon: Neftali Ochoa MD;  Location: [facility]     HERNIA REPAIR              Social History:     Social History     Tobacco Use     Smoking status: Every Day     Packs/day: 1     Types: Cigarettes     Smokeless tobacco: Never   Substance Use Topics  "    Alcohol use: Yes     Comment: Once/year            Family History:     Family History   Problem Relation Age of Onset     Anesthesia Reaction No family hx of      Deep Vein Thrombosis (DVT) No family hx of             Allergies:     Allergies   Allergen Reactions     Amoxicillin-Pot Clavulanate Anaphylaxis     Suprax [Cefixime] Anaphylaxis            Medications:     Current Outpatient Medications   Medication     ibuprofen (ADVIL/MOTRIN) 200 MG tablet     No current facility-administered medications for this visit.             Review of Systems:     A 10 point ROS was performed and reviewed. Specific responses to these questions are noted at the end of the document.         Physical Exam:   Vitals: /67   Pulse 68   Ht 1.651 m (5' 5\")   Wt 79.4 kg (175 lb)   BMI 29.12 kg/m    Constitutional: awake, alert, cooperative, no apparent distress, appears stated age.    Eyes: The sclera are white.  Ears, Nose, Throat: The trachea is midline.  Psychiatric: The patient has a normal affect.  Respiratory: breathing non-labored  Cardiovascular: The extremities are warm and perfused.  Skin: no obvious rashes or lesions.  Musculoskeletal, Neurologic, and Spine:          Lumbar Spine:    Appearance - No gross stepoffs or deformities    Motor -     L2-3: Hip flexion 5/5 R and 5/5 L strength          L3/4:  Knee extension R 5/5 and L 5/5 strength         L4/5:  Foot dorsiflexion R 5/5 L 5/5 and       EHL dorsiflexion R 5/5 L 5/5 strength         S1:  Plantarflexion/Peroneal Muscles  R 5/5 and L 5/5 strength    Sensation: intact to light touch L3-S1 distribution BLE      Neurologic:      REFLEXES Left Right   Biceps 1+ 1+   Triceps 1+ 1+   Brachioradialis 1+ 1+   Patella 1+ 1+   Ankle jerk 1+ 1+   Babinski No upgoing great toe No upgoing great toe   Clonus 0 beats 0 beats     Hip Exam:  No pain with hip log roll and no tenderness over the greater trochanters.    Alignment:  Patient stands with a neutral standing sagittal " balance.           Imaging:   We ordered and independently reviewed new radiographs at this clinic visit. The results were discussed with the patient.  Findings include:    January 10, 2024 lumbar MRI shows old and well-healed L2 fracture no obvious acute pathology    December 13 lumbar radiographs show old L2 fracture    January 4 lumbar MRI 2022 also shows old L2 fracture no change compared to current MRI             Assessment and Plan:   Assessment:  28 year old male with back and leg symptoms of uncertain etiology, no acute pathology on exam, chronic well-healed L2 fracture right which I do not think is a source of symptoms     Plan:  I do not see anything structurally abnormal in his spine.  I recommended that he try to advance his activities with physical therapy and do some work hardening recommended core strengthening traction physical therapy with lower extremity strengthening.  I refilled his diclofenac and his muscle relaxant.  Since has not been improving I did agree to order an L2-3 interlaminar injection since it is around the area of the fracture and also seems to be around the area that is hurting him but I do not think anything on imaging would support offering surgery at this time and I do not see anything structurally that I think should prevent long-term limitation or source of disability and I asked him to try to advance his activities with therapy.  I agreed to temporarily continue his work restrictions while he works through the therapy plan.        Respectfully,  Rey Elias MD  Spine Surgery  [facility]        Again, thank you for allowing me to participate in the care of your patient.        Sincerely,        Rey Elias MD

## 2024-02-16 NOTE — LETTER
February 16, 2024      Charbel Heard  [address]  [place] [address]        To Whom It May Concern,     Charbel Heard was seen in our clinic today. He may return to work but will need to continue sedentary work until further notice. He will be scheduled for an epidural spine injection but the date has not been determined yet.       Sincerely,        Rey Elias MD

## 2024-02-23 ENCOUNTER — TELEPHONE (OUTPATIENT)
Dept: PALLIATIVE MEDICINE | Facility: CLINIC | Age: 29
End: 2024-02-23

## 2024-02-23 DIAGNOSIS — M54.16 LUMBAR RADICULOPATHY: Primary | ICD-10-CM

## 2024-02-23 NOTE — TELEPHONE ENCOUNTER
"Screening Questions for Radiology Injections:    Injection to be done at which interventional clinic site? [facility] and [facility] [place]    If choosing [facility] for location, please inform patient:  \"[facility] is a Hospital based clinic. Before your visit, you should check with your insurance about how it covers the charges for facility services in a hospital-based clinic.     Procedure ordered by     Procedure ordered?   Lumbar            Injection Type:   Interlaminar JORDANA            Level:   L2-3                Transforaminal Cervical JORDANA - Send to [facility] - No [facility] providers perform this procedure    What insurance would patient like us to bill for this procedure? [facility] [place] # [id number] Insurance [phone] Laxmi Hayes  Fax [phone]  IF SCHEDULING IN [place] PAIN OR SPINE PLEASE SCHEDULE AT LEAST 7-10 BUSINESS DAYS OUT SO A PA CAN BE OBTAINED  Worker's comp or MVA (motor vehicle accident) -Any injection DO NOT SCHEDULE and route to Xander Ray.    [employer] insurance - For SI joint injections, DO NOT SCHEDULE and route to Esme Zhang.     ALL BCBS, [employer] and [employer] - DO NOT SCHEDULE and route to Esme Zhang  MEDICA- facet joint injections, route to Esme Zhang    Is patient scheduled at [facility]?    If YES, route every encounter to [facility] SPINE CENTER CARE NAVIGATION POOL [[phone]]    Is an  needed? No     Patient has a  home? (Review Grid) YES: Informed     Any chance of pregnancy? Not Applicable   If YES, do NOT schedule and route to RN angel  - Dr. Nunez route to Sangeeta Sheppard and PM&R Nurse  [[id number]]      Is patient actively being treated for cancer or immunocompromised? No  If YES, do NOT schedule and route to RN pool/ Dr. Nunez's Team    Does the patient have a bleeding or clotting disorder? No   If YES, okay to schedule AND route to RN nurse / Dr. Nunez's Team   (For any patients with platelet count " <100, RN must forward to provider)    Is patient taking any Blood Thinners OR Antiplatelet medication?  No   If hold needed, do NOT schedule, route to RN pool/ Dr. Nunez's Team  Examples:   Blood Thinners: (Coumadin, Warfarin, Jantoven, Pradaxa, Xarelto, Eliquis, Edoxaban, Enoxaparin, Lovenox, Heparin, Arixtra, Fondaparinux or Fragmin)  Antiplatelet Medications: (Plavix, Brilinta or Effient)     Is patient taking any aspirin products (includes Excedrin and Fiorinal)? No   If more than 325mg/day, OK to schedule; Instruct Pt to decrease to less than 325 mg for 7 days AND route to RN pool/ Dr. Nunez's Team   For CERVICAL procedures, hold all aspirin products for 6 days.   Tell Pt that if aspirin product is not held for 6 days, the procedure WILL BE cancelled.     Any allergies to contrast dye, iodine, shellfish, or numbing and steroid medications? No  If YES, schedule and add allergy information to appointment notes AND route to the RN pool/ Dr. Nunez's Team  If JORDANA and Contrast Dye / Iodine Allergy? DO NOT SCHEDULE, route to RN pool/ Dr. Nunez's Team  Allergies: Amoxicillin-pot clavulanate and Suprax [cefixime]     Does patient have an active infection or treated for one within the past week? No  Is patient currently taking any antibiotics or steroid medications?  No   For patients on chronic, preventative, or prophylactic antibiotics, procedures may be scheduled.   For patients on antibiotics for active or recent infection, schedule 4 days after completed.  For patients on steroid medications, schedule 4 days after completed.     Has the patient had a flu shot or any other vaccinations within the past 7 days? No  If yes, explain that for the vaccine to work best they need to:     wait 1 week before and 1 week after getting any Vaccine  wait 1 week before and 2 weeks after getting any Covid Vaccine   If patient has concerns about the timing, send to RN pool/ Dr. Nunez's Team    Does patient have an MRI/CT?  YES:  2024 Include Date and Check Procedure Scheduling Grid to see if required.  Was the MRI/CT done within the last 3 years?  Yes   If no route to RN Pool/ Dr. Nunez's Team  If yes, where was the MRI/CT done? [facility]    Refer to PACS Transmissions list for approved external locations and route to RN Pool High Priority/ Dr. Amayas Team  If MRI was not done at approved external location do NOT schedule and route to RN pool/ Dr. Nunez's Team    If patient has an imaging disc, the injection MAY be scheduled but patient must bring disc to appt or appt will be cancelled.    Is patient able to transfer to a procedure table with minimal or no assistance? Yes   If no, do NOT schedule and route to RN Pool/ Dr. Nunez's Team    Procedure Specific Instructions:  If celiac plexus block, informed patient NPO for 6 hours and that it is okay to take medications with sips of water, especially blood pressure medications Not Applicable       If this is for a cervical procedure, informed patient that aspirin needs to be held for 6 days.   Not Applicable    Sedation, If Sedation is ordered for any procedure, patient must be NPO for 6 hours prior to procedure Not Applicable    If IV needed:  Do not schedule procedures requiring IV placement in the first appointment of the day or first appointment after lunch. Do NOT schedule at 0745, 0815 or 1245.   Instructed patient to arrive 30 minutes early for IV start if required. (Check Procedure Scheduling Grid)  Not Applicable    Reminders:  If you are started on any steroids or antibiotics between now and your appointment, you must contact us because the procedure may need to be cancelled.  Yes    As a reminder, receiving steroids can decrease your body's ability to fight infection.   Would you still like to move forward with scheduling the injection?  Yes    IV Sedation is not provided for procedures. If oral anti-anxiety medication is needed, the patient should request this from their referring  provider.    Instruct patient to arrive as directed prior to the scheduled appointment time:  If IV needed 30 minutes before appointment time     For patients 85 or older we recommend having an adult stay w/ them for the remainder of the day.     If the patient is Diabetic, remind them to bring their glucometer.      Does the patient have any questions?  NO  Jessica Miller  [facility] [place]

## 2024-02-29 ENCOUNTER — TELEPHONE (OUTPATIENT)
Dept: NEUROSURGERY | Facility: CLINIC | Age: 29
End: 2024-02-29

## 2024-02-29 NOTE — TELEPHONE ENCOUNTER
[facility] Call Center    Phone Message    May a detailed message be left on voicemail: yes     Reason for Call: Other: Work comp has not received a request for patient injection. This needs to be approved before patient can proceed with scheduling. Work comp insurance company fax #  to Doris Hayes [phone]. [facility] fax # [phone].      Action Taken: Message routed to:  [facility]    Travel Screening: Not Applicable

## 2024-03-05 ENCOUNTER — THERAPY VISIT (OUTPATIENT)
Dept: PHYSICAL THERAPY | Facility: CLINIC | Age: 29
End: 2024-03-05
Attending: ORTHOPAEDIC SURGERY
Payer: OTHER MISCELLANEOUS

## 2024-03-05 DIAGNOSIS — G89.29 CHRONIC MIDLINE LOW BACK PAIN WITHOUT SCIATICA: ICD-10-CM

## 2024-03-05 DIAGNOSIS — M54.50 CHRONIC MIDLINE LOW BACK PAIN WITHOUT SCIATICA: ICD-10-CM

## 2024-03-05 DIAGNOSIS — G89.29 CHRONIC BILATERAL LOW BACK PAIN WITHOUT SCIATICA: Primary | ICD-10-CM

## 2024-03-05 DIAGNOSIS — M54.50 CHRONIC BILATERAL LOW BACK PAIN WITHOUT SCIATICA: Primary | ICD-10-CM

## 2024-03-05 PROCEDURE — 97161 PT EVAL LOW COMPLEX 20 MIN: CPT | Mod: GP | Performed by: PHYSICAL THERAPIST

## 2024-03-05 PROCEDURE — 97110 THERAPEUTIC EXERCISES: CPT | Mod: GP | Performed by: PHYSICAL THERAPIST

## 2024-03-05 NOTE — PROGRESS NOTES
PHYSICAL THERAPY EVALUATION  Type of Visit: Evaluation    See electronic medical record for Abuse and Falls Screening details.    Subjective       Presenting condition or subjective complaint: pulling on an samantha on a truck and felt a pop in back.  Feels like a weakness in R medial thigh.  Date of onset: 11/10/23    Relevant medical history: Smoking   Dates & types of surgery: hernia    Prior diagnostic imaging/testing results: MRI; X-ray   1. Chronic superior compression deformity of L2 with interval  resolution of edema. No progressive height loss.  2. No spinal canal or neural foraminal stenosis.     Prior therapy history for the same diagnosis, illness or injury: Yes december 2023    Prior Level of Function  Transfers: Independent  Ambulation: Independent  ADL: Independent  IADL:     Living Environment  Social support: With family members   Type of home: House; Multi-level   Stairs to enter the home: Yes 5 Is there a railing: Yes   Ramp: No   Stairs inside the home: Yes 20 Is there a railing: Yes   Help at home: None  Equipment owned:       Employment:     Hobbies/Interests: mechanics;  ride motorcycle    Patient goals for therapy:      Pain assessment:      Objective   LUMBAR SPINE EVALUATION  PAIN: Pain Level at Rest: 1/10  Pain Level with Use: 9/10  Pain Location: lumbar spine  Pain Quality: Stabbing  Pain Frequency: constant  Pain is Worst: nighttime  Pain is Exacerbated By: prolong sit (1 - 1.5 hours),  bumps in road;  lifting from floor  Pain is Relieved By: cold, heat, and minor activity  Pain Progression: Unchanged  INTEGUMENTARY (edema, incisions): WNL  POSTURE: WNL  GAIT:   Weightbearing Status:   Assistive Device(s):   Gait Deviations: WNL  BALANCE/PROPRIOCEPTION:   WEIGHTBEARING ALIGNMENT:   NON-WEIGHTBEARING ALIGNMENT:    ROM:   (Degrees) Left AROM Left PROM  Right AROM Right PROM   Hip Flexion       Hip Extension       Hip Abduction       Hip Adduction       Hip Internal Rotation       Hip  External Rotation       Knee Flexion       Knee Extension       Lumbar Side glide     Lumbar Flexion Hands to shins with slight pain    Lumbar Extension Normal - increases pain    Pain:   End feel:   PELVIC/SI SCREEN:   STRENGTH:  fair trunk strength    MYOTOMES: WNL  DTR S: WNL  CORD SIGNS:   DERMATOMES: WNL  NEURAL TENSION:    Left Right   SLR  Positive   SLR with DF  Positive   Femoral Nerve     Slump     Tony (Lumbar)     Tony (Thoracic)     Tony (Cervical)     Median     Ulnar     Radial        FLEXIBILITY:   LUMBAR/HIP Special Tests:    Left Right   ISHAN Negative  Negative    FADIR/Labrum/PADMINI Negative  Negative    Femoral Nerve     Jeffery's     Piriformis     Quadrant Testing     SLR Negative  Increases pain slightly   Slump Negative  Negative    Stork with Extension     Joe             PELVIS/SI SPECIAL TESTS:   FUNCTIONAL TESTS:   PALPATION:   + Tenderness At Location Left Right   Quadratus Lumborum     Erector Spinae + +   Piriformis      PSIS     ASIS     Iliac Crest     Glut Medius     Greater Trochanter     Ischial Tuberosity     Hamstrings     Hip Flexors     Vertebral        SPINAL SEGMENTAL CONCLUSIONS:  hypo throughout lumbar spine      Assessment & Plan   CLINICAL IMPRESSIONS  Medical Diagnosis: chronic midline LBP without sciatica    Treatment Diagnosis: LBP without sciatica   Impression/Assessment: Patient is a 28 year old male with LBP complaints.  The following significant findings have been identified: Pain, Decreased ROM/flexibility, Decreased joint mobility, Decreased strength, Impaired muscle performance, and Decreased activity tolerance. These impairments interfere with their ability to perform self care tasks, work tasks, recreational activities, household chores, driving , household mobility, and community mobility as compared to previous level of function.     Clinical Decision Making (Complexity):  Clinical Presentation: Stable/Uncomplicated  Clinical Presentation Rationale: based on  medical and personal factors listed in PT evaluation  Clinical Decision Making (Complexity): Low complexity    PLAN OF CARE  Treatment Interventions:  Interventions: Manual Therapy, Neuromuscular Re-education, Therapeutic Activity, Therapeutic Exercise    Long Term Goals     PT Goal 1  Goal Identifier: LBP  Goal Description: pt able to lift 50# from floor to desk level without  pain  Rationale: to maximize safety and independence with performance of ADLs and functional tasks;to maximize safety and independence within the home;to maximize safety and independence within the community;to maximize safety and independence with self cares  Target Date: 04/30/24      Frequency of Treatment: 1x/ week  Duration of Treatment: 8 weeks    Recommended Referrals to Other Professionals:   Education Assessment:   Learner/Method: Patient;Demonstration;Pictures/Video    Risks and benefits of evaluation/treatment have been explained.   Patient/Family/caregiver agrees with Plan of Care.     Evaluation Time:     PT Eval, Low Complexity Minutes (41321): 15       Signing Clinician: Joe Fischer PT

## 2024-03-05 NOTE — TELEPHONE ENCOUNTER
Faxed referral and supporting documentation for L2-3 Interlaminar JORDANA to [facility] .            Xander Ray  Complex   [facility]  Pain Management

## 2024-03-11 ENCOUNTER — TELEPHONE (OUTPATIENT)
Dept: NEUROSURGERY | Facility: CLINIC | Age: 29
End: 2024-03-11

## 2024-03-11 NOTE — TELEPHONE ENCOUNTER
[facility] Call Center    Phone Message    May a detailed message be left on voicemail: yes     Reason for Call: Other: Valentin is a  calling regarding Patient who is trying to schedule for a lumbar injection appointment. Per patient insurance has not received the prio auth. He is still waiting for the care team to fax over prior auth for the injection. Please call Valentin at [phone] and please send prior auth to fax:   [phone]       Action Taken: Other: [facility] [[id number]]    Travel Screening: Not Applicable

## 2024-03-13 NOTE — TELEPHONE ENCOUNTER
Per fax Denied Pending upcoming NANCY          Xander Cory  Complex   [facility]  Pain Management

## 2024-03-13 NOTE — TELEPHONE ENCOUNTER
Called and spoke with Margo, patient's QRC. Let her know that I would look into the process as typically we do not handle faxing the prior auths. I did state that we would fax the injection order which has been completed per rightfax.

## 2024-03-13 NOTE — TELEPHONE ENCOUNTER
Called and left message. Let QRC know that prior auth was still being completed (for locations besides [facility] it needs to be done prior to scheduling). Patient can check up on process by calling [phone] option 2. Let her know she can call back with any further questions.

## 2024-03-15 NOTE — TELEPHONE ENCOUNTER
Received  approval.              Xander Cory  Complex   [facility]  Pain [facility]

## 2024-03-15 NOTE — TELEPHONE ENCOUNTER
Patient's  Margo called to provide the  approval for the pt to have this injection LESI. She will be faxing the approval today. Please call the pt back to schedule once we have the approval information. Please call Margo-  at [phone] for any further questions.     Geetha Cadet      [facility]  Pain [facility]

## 2024-04-02 ENCOUNTER — TELEPHONE (OUTPATIENT)
Dept: ORTHOPEDICS | Facility: CLINIC | Age: 29
End: 2024-04-02

## 2024-04-02 ENCOUNTER — RADIOLOGY INJECTION OFFICE VISIT (OUTPATIENT)
Dept: PALLIATIVE MEDICINE | Facility: CLINIC | Age: 29
End: 2024-04-02
Attending: ORTHOPAEDIC SURGERY
Payer: OTHER MISCELLANEOUS

## 2024-04-02 VITALS — HEART RATE: 71 BPM | SYSTOLIC BLOOD PRESSURE: 142 MMHG | DIASTOLIC BLOOD PRESSURE: 100 MMHG | OXYGEN SATURATION: 96 %

## 2024-04-02 DIAGNOSIS — M54.50 CHRONIC MIDLINE LOW BACK PAIN WITHOUT SCIATICA: ICD-10-CM

## 2024-04-02 DIAGNOSIS — M54.16 LUMBAR RADICULOPATHY: ICD-10-CM

## 2024-04-02 DIAGNOSIS — G89.29 CHRONIC MIDLINE LOW BACK PAIN WITHOUT SCIATICA: ICD-10-CM

## 2024-04-02 PROCEDURE — 62323 NJX INTERLAMINAR LMBR/SAC: CPT | Performed by: PAIN MEDICINE

## 2024-04-02 RX ORDER — TRIAMCINOLONE ACETONIDE 40 MG/ML
40 INJECTION, SUSPENSION INTRA-ARTICULAR; INTRAMUSCULAR ONCE
Status: COMPLETED | OUTPATIENT
Start: 2024-04-02 | End: 2024-04-02

## 2024-04-02 RX ADMIN — TRIAMCINOLONE ACETONIDE 40 MG: 40 INJECTION, SUSPENSION INTRA-ARTICULAR; INTRAMUSCULAR at 15:50

## 2024-04-02 ASSESSMENT — PAIN SCALES - GENERAL
PAINLEVEL: SEVERE PAIN (6)
PAINLEVEL: MODERATE PAIN (4)

## 2024-04-02 NOTE — TELEPHONE ENCOUNTER
Patient has completed LESI today (4/2/2024).  Patient notes that he is needing to follow-up regarding return to work recommendations.     Patient states he needs to follow-up with either Dr. Meredith or Dr. Elias, whichever would be able to see patient soonest now that injection is complete.     Routing to [facility] for review.     Juliane Garcia RN  [facility]  [phone]

## 2024-04-02 NOTE — NURSING NOTE
Pre-procedure Intake  If YES to any questions or NO to having a   Please complete laminated checklist and leave on the computer keyboard for Provider, verbally inform provider if able.    For SCS Trial, RFA's or any sedation procedure:  Have you been fasting? NA  If yes, for how long?     Are you taking any any blood thinners such as Coumadin, Warfarin, Jantoven, Pradaxa Xarelto, Eliquis, Edoxaban, Enoxaparin, Lovenox, Heparin, Arixtra, Fondaparinux, or Fragmin? OR Antiplatelet medication such as Plavix, Brilinta, or Effient?   No   If yes, when did you take your last dose?     Do you take aspirin?  No  If cervical procedure, have you held aspirin for 6 days?   NA    Do you have any allergies to contrast dye, iodine, steroid and/or numbing medications?  NO    Are you currently taking antibiotics or have an active infection?  NO    Have you had a fever/elevated temperature within the past week? NO    Are you currently taking oral steroids? NO    Do you have a ? Yes    Are you pregnant or breastfeeding?  NO    Have you received the COVID-19 vaccine? No   If yes, was it your 1st, 2nd or only dose needed?   Date of most recent vaccine:     Notify provider and RNs if systolic BP >170, diastolic BP >100, P >100 or O2 sats < 90%     Sangeeta Ochoa [facility] [place]

## 2024-04-02 NOTE — NURSING NOTE
Discharge Information    IV Discontiued Time:  NA    Amount of Fluid Infused:  NA    Discharge Criteria = When patient returns to baseline or as per MD order    Consciousness:  Pt is fully awake    Circulation:  BP +/- 20% of pre-procedure level    Respiration:  Patient is able to breathe deeply    O2 Sat:  Patient is able to maintain O2 Sat >92% on room air    Activity:  Moves 4 extremities on command    Ambulation:  Patient is able to stand and walk or stand and pivot into wheelchair    Dressing:  Clean/dry or No Dressing    Notes:   Discharge instructions and AVS given to patient    Patient meets criteria for discharge?  YES    Admitted to PCU?  No    Responsible adult present to accompany patient home?  Yes    Signature/Title:    Juliane Garcia RN  RN Care Coordinator  [facility] [place]

## 2024-04-02 NOTE — PATIENT INSTRUCTIONS
[facility]   Procedure Discharge Instructions    Today you saw:     Dr. Moko Toth,         You had an:  Epidural steroid injection     Be cautious when walking. Numbness and/or weakness in the lower extremities may occur for up to 6-8 hours after the procedure due to effect of the local anesthetic  Do not drive for 6 hours. The effect of the local anesthetic could slow your reflexes.   You may resume your regular activities after 24 hours  Avoid strenuous activity for the first 24 hours  You may shower, however avoid swimming, tub baths or hot tubs for 24 hours following your procedure  You may have a mild to moderate increase in pain for several days following the injection.  It may take up to 14 days for the steroid medication to start working although you may feel the effect as early as a few days after the procedure.     You may use ice packs for 10-15 minutes, 3 to 4 times a day at the injection site for comfort  Do not use heat to painful areas for 6 to 8 hours. This will give the local anesthetic time to wear off and prevent you from accidentally burning your skin.   Unless you have been directed to avoid the use of anti-inflammatory medications (NSAIDS), you may use medications such as ibuprofen, Aleve or Tylenol for pain control if needed.   Possible side effects of steroids that you may experience include flushing, elevated blood pressure, increased appetite, mild headaches and restlessness.  All of these symptoms will get better with time.  If you experience any of the following, call the Pain Clinic during work hours (Mon-Friday 8-4:30 pm) at [phone] or the Provider Line after hours at [phone]:  -Fever over 100 degree F  -Swelling, bleeding, redness, drainage, warmth at the injection site  -Progressive weakness or numbness in your legs   -Loss of bowel or bladder function  -Unusual new onset of pain that is not improving

## 2024-04-04 NOTE — PROGRESS NOTES
Pre procedure Diagnosis: Lumbar radiculopathy  Post procedure Diagnosis: Same  Procedure performed: L2-3 interlaminar epidural steroid injection   Anesthesia: nonw  Complications: nonw  Operators: Mook Toth MD     Indications:   Charbel Heard is a 28 year old male.  The patient has a history of lbp .  Examination shows neg slump.  he has tried conservative treatment including meds.    MRI rev  Options/alternatives, benefits and risks were discussed with the patient including but not limited to bleeding, infection, no pain relief, tissue trauma, exposure to radiation, reaction to medications, spinal cord injury, dural puncture, weakness, numbness and headache.  Questions were answered to his satisfaction and he wishes to proceed. Voluntary informed consent was obtained and signed.     Vitals were reviewed: Ye  Allergies were reviewed:  Yes   Medications were reviewed:  Yes   Pre-procedure pain score: 4/10    Procedure:  The patient's medical history, medications, and allergies were reviewed and reconciled.  After obtaining signed informed consent, the patient was brought into the procedure suite and was placed in a prone position on the procedure table.   A Pause for the Cause was performed.  Patient was prepped and draped in the usual sterile fashion.     The L2-3 interspace was identified with AP fluoroscopy.  A total of 2ml of 1% lidocaine was used to anesthetize the skin and subcutaneous tissues for a  midline approach.    A 20gauge 3.5inch Touhy needle was advanced utilizing intermittent AP and Lateral fluoroscopy and air for loss of resistance.  The epidural space was encountered on the first pass without difficulties.  Aspiration for blood and CSF was negative.  Needle position was verified by injecting 1 ml of Omnipaque utilizing real-time fluoroscopy that showed good needle placement and epidural spread without signs of intravascular or intrathecal uptake.  Omnipaque wasted:  9 ml.    Then, after  repeated negative aspiration for blood or CSF, a combination of Kenalog 40 mg, Bupivicaine 0.25% 2 ml, diluted with 3 ml of normal saline to a total injectate volume of 6 ml was injected into the epidural space in a slow and incremental fashion and the needle was restyletted and withdrawn.  All injected medications were preservative free.    The injection site was cleaned and a sterile dressing was applied.    The patient tolerated the procedure well without complications and was taken to the recovery room for continued observation.    Images were saved to PACS.    Post-procedure pain score: 6/10  Follow-up includes:   -f/u with referring provider     Mook Toth MD  [place] [facility] [place]

## 2024-04-16 ENCOUNTER — OFFICE VISIT (OUTPATIENT)
Dept: ORTHOPEDICS | Facility: CLINIC | Age: 29
End: 2024-04-16
Payer: OTHER MISCELLANEOUS
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
## 2024-04-16 DIAGNOSIS — M51.369 LUMBAR DEGENERATIVE DISC DISEASE: ICD-10-CM

## 2024-04-16 DIAGNOSIS — S32.020A COMPRESSION FRACTURE OF L2 VERTEBRA, INITIAL ENCOUNTER (H): ICD-10-CM

## 2024-04-16 DIAGNOSIS — M62.830 LUMBAR PARASPINAL MUSCLE SPASM: Primary | ICD-10-CM

## 2024-04-16 PROCEDURE — 99213 OFFICE O/P EST LOW 20 MIN: CPT | Performed by: PREVENTIVE MEDICINE

## 2024-04-16 NOTE — LETTER
Return to Work  2024     Seen today: Yes    Patient:  Charbel Heard  :   1995  MRN:     [id number]  Physician: REBEL WONG    To whom it may concern,   Charbel Heard has been under my professional care for his lumbar spine. Starting 2024 through May 7th, 2024, he is allowed to return to work with the following restrictions:    - he may work up to 4 hours per shift and up to 5 days per week.    He will follow up in my clinic on 2024, at that time we will reassess his work restrictions and provide an updated work letter.    Sincerely,   Electronically signed by Rebel Wong MD

## 2024-04-16 NOTE — PROGRESS NOTES
HISTORY OF PRESENT ILLNESS  Mr. Heard is a pleasant 29 year old year old male who presents to clinic today with the following:    What problem are you here for: Follow up for bilateral low back pain.   He has not returned to work since November 2023.  Patient was seen with Dr. Rey Elias on 2/16/2024 for this issue.   He works as .     How long have you had this problem: 11/10/2023, 6 months    Have you had any recent imaging of this problem? Xrays/MRI/CT scans:  - XR of lumbar spine completed on 12/13/2023  - MRI of lumbar spine completed on 1/10/2024    Have you had treatments for this problem in the past?  -Medications: Tylenol and ibuprofen ~ 3-4 times weekly.   -Physical therapy: He has attended one PT appointment on 3/5/2024 since injection and continues with his HEP.   -Injections:  4/2/2024 Lumbar JORDANA: he reports injection has helped decrease his pain by 90%   -Surgery: None     How severe is this problem today? 0-10 scale: 1/10    How severe has this problem been at WORST in the past? 0-10 scale: 2-3/10, intermittently when he sits for long periods of time or when he gets up in the AM.     What do you think caused this problem: WC    Does this problem or its symptoms cause difficulty for you falling asleep or staying asleep: Improved.    Anything else you want us to know about this problem:  [facility] is present for appointment today.       MEDICAL HISTORY  Patient Active Problem List   Diagnosis    Tobacco use disorder    Chronic bilateral low back pain without sciatica       Current Outpatient Medications   Medication Sig Dispense Refill    ibuprofen (ADVIL/MOTRIN) 200 MG tablet Take 800 mg by mouth every 4 hours as needed for pain      diclofenac (VOLTAREN) 75 MG EC tablet Take 1 tablet (75 mg) by mouth 2 times daily as needed for moderate pain (Patient not taking: Reported on 4/2/2024) 60 tablet 1    tiZANidine (ZANAFLEX) 4 MG tablet Take 1 tablet (4 mg) by mouth nightly as needed  for muscle spasms (Patient not taking: Reported on 4/2/2024) 30 tablet 1       Allergies   Allergen Reactions    Amoxicillin-Pot Clavulanate Anaphylaxis    Suprax [Cefixime] Anaphylaxis       Family History   Problem Relation Age of Onset    Anesthesia Reaction No family hx of     Deep Vein Thrombosis (DVT) No family hx of      Social History     Socioeconomic History    Marital status: Single   Tobacco Use    Smoking status: Every Day     Current packs/day: 1.00     Types: Cigarettes    Smokeless tobacco: Never   Vaping Use    Vaping status: Never Used   Substance and Sexual Activity    Alcohol use: Yes     Comment: Once/year    Drug use: No    Sexual activity: Yes     Partners: Female     Birth control/protection: Condom       Additional medical/Social/Surgical histories reviewed in [facility] and updated as appropriate.     REVIEW OF SYSTEMS (4/16/2024)  10 point ROS of systems including Constitutional, Eyes, Respiratory, Cardiovascular, Gastroenterology, Genitourinary, Integumentary, Musculoskeletal, Psychiatric, Allergic/Immunologic were all negative except for pertinent positives noted in my HPI.     PHYSICAL EXAM  VSS  Vital Signs: There were no vitals taken for this visit. Patient declined being weighed. There is no height or weight on file to calculate BMI.    General  - normal appearance, in no obvious distress  HEENT  - conjunctivae not injected, moist mucous membranes, normocephalic/atraumatic head, ears normal appearance, no lesions, mouth normal appearance, no scars, normal dentition and teeth present  CV  - normal peripheral perfusion  Pulm  - normal respiratory pattern, non-labored  Musculoskeletal - lumbar spine  - stance: normal gait without limp, no obvious leg length discrepancy, normal heel and toe walk  - inspection: normal bone and joint alignment, no obvious scoliosis  - palpation: no paravertebral or bony tenderness  - ROM: flexion does not exacerbates pain, normal extension, sidebending,  rotation  - strength: lower extremities 5/5 in all planes  - special tests:  (-) straight leg raise  (-) slump test  Neuro  - patellar and Achilles DTRs 2+ bilaterally, NO lower extremity sensory deficit throughout L5 distribution, grossly normal coordination, normal muscle tone  Skin  - no ecchymosis, erythema, warmth, or induration, no obvious rash  Psych  - interactive, appropriate, normal mood and affect      ASSESSMENT & PLAN  28 yo male with lumbar discogenic pain, compression fracture, nerve root impingement, work related injury, improved overall    I independently reviewed the following imaging studies:  Lumbar MRI shows disc buldges in lumbar spine and previous compression fracture  Superior endplate compression deformity of the L2  vertebral body with loss of 30-40%   Cont. Ibuprofen PRN  Discussed work restrictions to 4 hours daily until next appointment given letter    Patient HAS completed physical therapy for 4-6 weeks  Patient has been doing home exercise physical therapy program for this problem      Appropriate PPE was utilized for prevention of spread of Covid-19.  Rebel Meredith MD, [facility]

## 2024-04-16 NOTE — LETTER
4/16/2024         RE: Charbel Heard  [address] [facility] [place] [address]        Dear Colleague,    Thank you for referring your patient, Charbel Heard, to the [facility] [place]. Please see a copy of my visit note below.    HISTORY OF PRESENT ILLNESS  Mr. Heard is a pleasant 29 year old year old male who presents to clinic today with the following:    What problem are you here for: Follow up for bilateral low back pain.   He has not returned to work since November 2023.  Patient was seen with Dr. Rey Elias on 2/16/2024 for this issue.   He works as .     How long have you had this problem: 11/10/2023, 6 months    Have you had any recent imaging of this problem? Xrays/MRI/CT scans:  - XR of lumbar spine completed on 12/13/2023  - MRI of lumbar spine completed on 1/10/2024    Have you had treatments for this problem in the past?  -Medications: Tylenol and ibuprofen ~ 3-4 times weekly.   -Physical therapy: He has attended one PT appointment on 3/5/2024 since injection and continues with his HEP.   -Injections:  4/2/2024 Lumbar JORDANA: he reports injection has helped decrease his pain by 90%   -Surgery: None     How severe is this problem today? 0-10 scale: 1/10    How severe has this problem been at WORST in the past? 0-10 scale: 2-3/10, intermittently when he sits for long periods of time or when he gets up in the AM.     What do you think caused this problem: WC    Does this problem or its symptoms cause difficulty for you falling asleep or staying asleep: Improved.    Anything else you want us to know about this problem:  [facility] is present for appointment today.       MEDICAL HISTORY  Patient Active Problem List   Diagnosis     Tobacco use disorder     Chronic bilateral low back pain without sciatica       Current Outpatient Medications   Medication Sig Dispense Refill     ibuprofen (ADVIL/MOTRIN) 200 MG tablet Take 800 mg by mouth every 4 hours  as needed for pain       diclofenac (VOLTAREN) 75 MG EC tablet Take 1 tablet (75 mg) by mouth 2 times daily as needed for moderate pain (Patient not taking: Reported on 4/2/2024) 60 tablet 1     tiZANidine (ZANAFLEX) 4 MG tablet Take 1 tablet (4 mg) by mouth nightly as needed for muscle spasms (Patient not taking: Reported on 4/2/2024) 30 tablet 1       Allergies   Allergen Reactions     Amoxicillin-Pot Clavulanate Anaphylaxis     Suprax [Cefixime] Anaphylaxis       Family History   Problem Relation Age of Onset     Anesthesia Reaction No family hx of      Deep Vein Thrombosis (DVT) No family hx of      Social History     Socioeconomic History     Marital status: Single   Tobacco Use     Smoking status: Every Day     Current packs/day: 1.00     Types: Cigarettes     Smokeless tobacco: Never   Vaping Use     Vaping status: Never Used   Substance and Sexual Activity     Alcohol use: Yes     Comment: Once/year     Drug use: No     Sexual activity: Yes     Partners: Female     Birth control/protection: Condom       Additional medical/Social/Surgical histories reviewed in [facility] and updated as appropriate.     REVIEW OF SYSTEMS (4/16/2024)  10 point ROS of systems including Constitutional, Eyes, Respiratory, Cardiovascular, Gastroenterology, Genitourinary, Integumentary, Musculoskeletal, Psychiatric, Allergic/Immunologic were all negative except for pertinent positives noted in my HPI.     PHYSICAL EXAM  VSS  Vital Signs: There were no vitals taken for this visit. Patient declined being weighed. There is no height or weight on file to calculate BMI.    General  - normal appearance, in no obvious distress  HEENT  - conjunctivae not injected, moist mucous membranes, normocephalic/atraumatic head, ears normal appearance, no lesions, mouth normal appearance, no scars, normal dentition and teeth present  CV  - normal peripheral perfusion  Pulm  - normal respiratory pattern, non-labored  Musculoskeletal - lumbar spine  - stance:  normal gait without limp, no obvious leg length discrepancy, normal heel and toe walk  - inspection: normal bone and joint alignment, no obvious scoliosis  - palpation: no paravertebral or bony tenderness  - ROM: flexion does not exacerbates pain, normal extension, sidebending, rotation  - strength: lower extremities 5/5 in all planes  - special tests:  (-) straight leg raise  (-) slump test  Neuro  - patellar and Achilles DTRs 2+ bilaterally, NO lower extremity sensory deficit throughout L5 distribution, grossly normal coordination, normal muscle tone  Skin  - no ecchymosis, erythema, warmth, or induration, no obvious rash  Psych  - interactive, appropriate, normal mood and affect      ASSESSMENT & PLAN  30 yo male with lumbar discogenic pain, compression fracture, nerve root impingement, work related injury, improved overall    I independently reviewed the following imaging studies:  Lumbar MRI shows disc buldges in lumbar spine and previous compression fracture  Superior endplate compression deformity of the L2  vertebral body with loss of 30-40%   Cont. Ibuprofen PRN  Discussed work restrictions to 4 hours daily until next appointment given letter    Patient HAS completed physical therapy for 4-6 weeks  Patient has been doing home exercise physical therapy program for this problem      Appropriate PPE was utilized for prevention of spread of Covid-19.  Rebel Meredith MD, [facility]      Again, thank you for allowing me to participate in the care of your patient.        Sincerely,        Rebel Meredith MD

## 2024-04-30 ENCOUNTER — THERAPY VISIT (OUTPATIENT)
Dept: PHYSICAL THERAPY | Facility: CLINIC | Age: 29
End: 2024-04-30
Payer: OTHER MISCELLANEOUS

## 2024-04-30 DIAGNOSIS — G89.29 CHRONIC BILATERAL LOW BACK PAIN WITHOUT SCIATICA: Primary | ICD-10-CM

## 2024-04-30 DIAGNOSIS — M54.50 CHRONIC BILATERAL LOW BACK PAIN WITHOUT SCIATICA: Primary | ICD-10-CM

## 2024-04-30 PROCEDURE — 97110 THERAPEUTIC EXERCISES: CPT | Mod: GP | Performed by: PHYSICAL THERAPIST

## 2024-05-29 PROBLEM — G89.29 CHRONIC BILATERAL LOW BACK PAIN WITHOUT SCIATICA: Status: RESOLVED | Noted: 2024-03-05 | Resolved: 2024-05-29

## 2024-05-29 PROBLEM — M54.50 CHRONIC BILATERAL LOW BACK PAIN WITHOUT SCIATICA: Status: RESOLVED | Noted: 2024-03-05 | Resolved: 2024-05-29

## 2024-05-29 NOTE — PROGRESS NOTES
DISCHARGE SUMMARY    Charbel Heard was seen 2  times for evaluation and treatment.  Patient did not return for further treatment and current status is unknown.  Due to short treatment duration, no objective or functional changes were made.  Please see goal flow sheet from episode noted date below and initial evaluation for further information.  Patient is discharged from therapy and therapy episode is resolved as of 05/29/24.      Linked Episodes   Type: Episode: Status: Noted: Resolved: Last update: Updated by:   PHYSICAL THERAPY [facility] 3/5/2024 Active 3/5/2024  4/30/2024  1:24 PM Joe Fischer, PT      Comments:      Reported minimal sx at last visit attended.

## 2024-07-07 ENCOUNTER — HEALTH MAINTENANCE LETTER (OUTPATIENT)
Age: 29
End: 2024-07-07

## 2024-09-13 ENCOUNTER — DOCUMENTATION ONLY (OUTPATIENT)
Dept: OTHER | Facility: CLINIC | Age: 29
End: 2024-09-13

## 2025-07-13 ENCOUNTER — HEALTH MAINTENANCE LETTER (OUTPATIENT)
Age: 30
End: 2025-07-13

## 2025-08-16 ENCOUNTER — APPOINTMENT (OUTPATIENT)
Dept: CT IMAGING | Facility: CLINIC | Age: 30
End: 2025-08-16
Attending: NURSE PRACTITIONER

## 2025-08-16 ENCOUNTER — HOSPITAL ENCOUNTER (EMERGENCY)
Facility: CLINIC | Age: 30
Discharge: HOME | End: 2025-08-16
Attending: NURSE PRACTITIONER | Admitting: NURSE PRACTITIONER

## 2025-08-16 VITALS
BODY MASS INDEX: 26.63 KG/M2 | SYSTOLIC BLOOD PRESSURE: 151 MMHG | TEMPERATURE: 98 F | DIASTOLIC BLOOD PRESSURE: 91 MMHG | RESPIRATION RATE: 16 BRPM | WEIGHT: 160 LBS | OXYGEN SATURATION: 100 % | HEART RATE: 79 BPM

## 2025-08-16 DIAGNOSIS — M54.50 ACUTE MIDLINE LOW BACK PAIN WITHOUT SCIATICA: Primary | ICD-10-CM

## 2025-08-16 PROCEDURE — 96372 THER/PROPH/DIAG INJ SC/IM: CPT | Performed by: NURSE PRACTITIONER

## 2025-08-16 PROCEDURE — 72131 CT LUMBAR SPINE W/O DYE: CPT

## 2025-08-16 PROCEDURE — 99285 EMERGENCY DEPT VISIT HI MDM: CPT | Mod: 25 | Performed by: NURSE PRACTITIONER

## 2025-08-16 PROCEDURE — 250N000011 HC RX IP 250 OP 636: Performed by: NURSE PRACTITIONER

## 2025-08-16 RX ORDER — CYCLOBENZAPRINE HCL 10 MG
10 TABLET ORAL 3 TIMES DAILY PRN
Qty: 15 TABLET | Refills: 0 | Status: SHIPPED | OUTPATIENT
Start: 2025-08-16

## 2025-08-16 RX ORDER — OXYCODONE HYDROCHLORIDE 5 MG/1
5 TABLET ORAL EVERY 6 HOURS PRN
Qty: 6 TABLET | Refills: 0 | Status: SHIPPED | OUTPATIENT
Start: 2025-08-16

## 2025-08-16 RX ADMIN — HYDROMORPHONE HYDROCHLORIDE 1 MG: 1 INJECTION, SOLUTION INTRAMUSCULAR; INTRAVENOUS; SUBCUTANEOUS at 20:54

## 2025-08-16 ASSESSMENT — ACTIVITIES OF DAILY LIVING (ADL)
ADLS_ACUITY_SCORE: 41

## 2025-08-16 ASSESSMENT — COLUMBIA-SUICIDE SEVERITY RATING SCALE - C-SSRS
6. HAVE YOU EVER DONE ANYTHING, STARTED TO DO ANYTHING, OR PREPARED TO DO ANYTHING TO END YOUR LIFE?: NO
1. IN THE PAST MONTH, HAVE YOU WISHED YOU WERE DEAD OR WISHED YOU COULD GO TO SLEEP AND NOT WAKE UP?: NO
2. HAVE YOU ACTUALLY HAD ANY THOUGHTS OF KILLING YOURSELF IN THE PAST MONTH?: NO

## (undated) RX ORDER — FENTANYL CITRATE 50 UG/ML
INJECTION, SOLUTION INTRAMUSCULAR; INTRAVENOUS
Status: DISPENSED
Start: 2022-02-22